# Patient Record
Sex: MALE | Race: BLACK OR AFRICAN AMERICAN | Employment: OTHER | URBAN - METROPOLITAN AREA
[De-identification: names, ages, dates, MRNs, and addresses within clinical notes are randomized per-mention and may not be internally consistent; named-entity substitution may affect disease eponyms.]

---

## 2022-10-20 ENCOUNTER — OFFICE VISIT (OUTPATIENT)
Dept: FAMILY MEDICINE CLINIC | Age: 72
End: 2022-10-20
Payer: MEDICARE

## 2022-10-20 VITALS
OXYGEN SATURATION: 96 % | TEMPERATURE: 97.9 F | WEIGHT: 221 LBS | HEART RATE: 74 BPM | HEIGHT: 72 IN | SYSTOLIC BLOOD PRESSURE: 139 MMHG | DIASTOLIC BLOOD PRESSURE: 77 MMHG | BODY MASS INDEX: 29.93 KG/M2 | RESPIRATION RATE: 16 BRPM

## 2022-10-20 DIAGNOSIS — Z94.4 HISTORY OF LIVER TRANSPLANT (HCC): Primary | ICD-10-CM

## 2022-10-20 DIAGNOSIS — K70.30 ALCOHOLIC CIRRHOSIS OF LIVER WITHOUT ASCITES (HCC): ICD-10-CM

## 2022-10-20 DIAGNOSIS — I10 ESSENTIAL HYPERTENSION: ICD-10-CM

## 2022-10-20 DIAGNOSIS — R68.82 DECREASED LIBIDO: ICD-10-CM

## 2022-10-20 DIAGNOSIS — N52.01 ERECTILE DYSFUNCTION DUE TO ARTERIAL INSUFFICIENCY: ICD-10-CM

## 2022-10-20 DIAGNOSIS — K21.9 GASTROESOPHAGEAL REFLUX DISEASE WITHOUT ESOPHAGITIS: ICD-10-CM

## 2022-10-20 DIAGNOSIS — Z13.220 ENCOUNTER FOR LIPID SCREENING FOR CARDIOVASCULAR DISEASE: ICD-10-CM

## 2022-10-20 DIAGNOSIS — Z11.59 ENCOUNTER FOR HEPATITIS C SCREENING TEST FOR LOW RISK PATIENT: ICD-10-CM

## 2022-10-20 DIAGNOSIS — Z13.6 ENCOUNTER FOR LIPID SCREENING FOR CARDIOVASCULAR DISEASE: ICD-10-CM

## 2022-10-20 DIAGNOSIS — Z12.11 COLON CANCER SCREENING: ICD-10-CM

## 2022-10-20 PROCEDURE — G8417 CALC BMI ABV UP PARAM F/U: HCPCS | Performed by: FAMILY MEDICINE

## 2022-10-20 PROCEDURE — 1123F ACP DISCUSS/DSCN MKR DOCD: CPT | Performed by: FAMILY MEDICINE

## 2022-10-20 PROCEDURE — G8536 NO DOC ELDER MAL SCRN: HCPCS | Performed by: FAMILY MEDICINE

## 2022-10-20 PROCEDURE — 3078F DIAST BP <80 MM HG: CPT | Performed by: FAMILY MEDICINE

## 2022-10-20 PROCEDURE — G8427 DOCREV CUR MEDS BY ELIG CLIN: HCPCS | Performed by: FAMILY MEDICINE

## 2022-10-20 PROCEDURE — 3017F COLORECTAL CA SCREEN DOC REV: CPT | Performed by: FAMILY MEDICINE

## 2022-10-20 PROCEDURE — 99204 OFFICE O/P NEW MOD 45 MIN: CPT | Performed by: FAMILY MEDICINE

## 2022-10-20 PROCEDURE — 1101F PT FALLS ASSESS-DOCD LE1/YR: CPT | Performed by: FAMILY MEDICINE

## 2022-10-20 PROCEDURE — G8510 SCR DEP NEG, NO PLAN REQD: HCPCS | Performed by: FAMILY MEDICINE

## 2022-10-20 PROCEDURE — 3075F SYST BP GE 130 - 139MM HG: CPT | Performed by: FAMILY MEDICINE

## 2022-10-20 RX ORDER — MYCOPHENOLIC ACID 360 MG/1
360 TABLET, DELAYED RELEASE ORAL 2 TIMES DAILY
COMMUNITY
Start: 2022-09-23 | End: 2022-12-22

## 2022-10-20 RX ORDER — OMEPRAZOLE 40 MG/1
40 CAPSULE, DELAYED RELEASE ORAL DAILY
COMMUNITY

## 2022-10-20 RX ORDER — POLYETHYLENE GLYCOL 3350 17 G/17G
17 POWDER, FOR SOLUTION ORAL
COMMUNITY

## 2022-10-20 RX ORDER — DAPSONE 100 MG/1
100 TABLET ORAL DAILY
COMMUNITY
Start: 2022-07-26

## 2022-10-20 RX ORDER — TACROLIMUS 1 MG/1
CAPSULE ORAL EVERY 12 HOURS
COMMUNITY
End: 2022-10-20 | Stop reason: SDUPTHER

## 2022-10-20 RX ORDER — MYCOPHENOLIC ACID 360 MG/1
TABLET, DELAYED RELEASE ORAL 2 TIMES DAILY
COMMUNITY
End: 2022-10-20 | Stop reason: SDUPTHER

## 2022-10-20 RX ORDER — AMLODIPINE BESYLATE 5 MG/1
5 TABLET ORAL DAILY
COMMUNITY
End: 2022-11-28 | Stop reason: SDUPTHER

## 2022-10-20 RX ORDER — IBUPROFEN 200 MG
1 CAPSULE ORAL DAILY
COMMUNITY

## 2022-10-20 RX ORDER — ENALAPRIL MALEATE AND HYDROCHLOROTHIAZIDE 10; 25 MG/1; MG/1
1 TABLET ORAL DAILY
COMMUNITY
End: 2022-10-25 | Stop reason: ALTCHOICE

## 2022-10-20 RX ORDER — METOPROLOL SUCCINATE 25 MG/1
TABLET, EXTENDED RELEASE ORAL DAILY
COMMUNITY
End: 2022-10-25 | Stop reason: ALTCHOICE

## 2022-10-20 RX ORDER — DOCUSATE SODIUM 100 MG/1
1 CAPSULE, LIQUID FILLED ORAL
COMMUNITY

## 2022-10-20 RX ORDER — TADALAFIL 20 MG/1
20 TABLET ORAL DAILY
Qty: 90 TABLET | Refills: 5 | Status: SHIPPED | OUTPATIENT
Start: 2022-10-20

## 2022-10-20 RX ORDER — DAPSONE 100 MG/1
100 TABLET ORAL DAILY
COMMUNITY
End: 2022-10-20 | Stop reason: SDUPTHER

## 2022-10-20 NOTE — PROGRESS NOTES
Danita Mendes is a 66 y.o.  male and presents with    Chief Complaint   Patient presents with    New Patient       Subjective:  Hypertension  Patient is here for evaluation of hypertension. Age at onset of elevated blood pressure:  48.  He indicates that he is feeling well and denies any symptoms referable to his elevated blood pressure. Specifically denies chest pain, palpitations, dyspnea, orthopnea, PND or peripheral edema. Current medication regimen is as listed   below. Patient has these side effects of medication:  none . Cardiovascular risk factors: smoking/ tobacco exposure, dyslipidemia, male gender, hypertension Use of agents associated with hypertension: none History of renal disease: positive  History of flank trauma: negative    He has history of liver transplant with his brother as his donor. ROS     All other systems reviewed and are negative. Objective:  Vitals:    10/20/22 1537   BP: 139/77   Pulse: 74   Resp: 16   Temp: 97.9 °F (36.6 °C)   TempSrc: Temporal   SpO2: 96%   Weight: 221 lb (100.2 kg)   Height: 6' (1.829 m)   PainSc:   0 - No pain       alert, well appearing, and in no distress, oriented to person, place, and time, and overweight  Mental status - normal mood, behavior, speech, dress, motor activity, and thought processes  Chest - clear to auscultation, no wheezes, rales or rhonchi, symmetric air entry  Heart - normal rate, regular rhythm, normal S1, S2, no murmurs, rubs, clicks or gallops  Neurological - cranial nerves II through XII intact  Skin - normal coloration and turgor, no rashes, no suspicious skin lesions noted    LABS     TESTS      Assessment/Plan:    1. History of liver transplant [facility])  Refer for further evaluation and treatment  - REFERRAL TO GASTROENTEROLOGY    2. Alcoholic cirrhosis of liver without ascites ([facility] [address]      3. Essential hypertension  Goal <130/80; assess renal function  - METABOLIC PANEL, COMPREHENSIVE; Future    4. Gastroesophageal reflux disease without esophagitis  Continue treatment    5. Encounter for lipid screening for cardiovascular disease    - LIPID PANEL; Future    6. Encounter for hepatitis C screening test for low risk patient    - HEPATITIS C AB; Future    7. Decreased libido  Assess for low T  - TESTOSTERONE, FREE; Future    8. Erectile dysfunction due to arterial insufficiency  Start treatment for symptom  - tadalafiL (ADCIRCA) 20 mg tablet; Take 1 Tablet by mouth daily. Dispense: 90 Tablet; Refill: 5  - TESTOSTERONE, FREE; Future    9. Colon cancer screening    - OCCULT BLOOD IMMUNOASSAY,DIAGNOSTIC; Future      Lab review: orders written for new lab studies as appropriate; see orders      I have discussed the diagnosis with the patient and the intended plan as seen in the above orders. The patient has received an after-visit summary and questions were answered concerning future plans. I have discussed medication side effects and warnings with the patient as well. I have reviewed the plan of care with the patient, accepted their input and they are in agreement with the treatment goals.

## 2022-10-24 ENCOUNTER — HOSPITAL ENCOUNTER (OUTPATIENT)
Dept: LAB | Age: 72
Discharge: HOME | End: 2022-10-24
Payer: MEDICARE

## 2022-10-24 DIAGNOSIS — Z13.6 ENCOUNTER FOR LIPID SCREENING FOR CARDIOVASCULAR DISEASE: ICD-10-CM

## 2022-10-24 DIAGNOSIS — R68.82 DECREASED LIBIDO: ICD-10-CM

## 2022-10-24 DIAGNOSIS — I10 ESSENTIAL HYPERTENSION: ICD-10-CM

## 2022-10-24 DIAGNOSIS — N52.01 ERECTILE DYSFUNCTION DUE TO ARTERIAL INSUFFICIENCY: ICD-10-CM

## 2022-10-24 DIAGNOSIS — Z11.59 ENCOUNTER FOR HEPATITIS C SCREENING TEST FOR LOW RISK PATIENT: ICD-10-CM

## 2022-10-24 DIAGNOSIS — Z13.220 ENCOUNTER FOR LIPID SCREENING FOR CARDIOVASCULAR DISEASE: ICD-10-CM

## 2022-10-24 LAB
ALBUMIN SERPL-MCNC: 3.8 G/DL (ref 3.4–5)
ALBUMIN/GLOB SERPL: 1.1 {RATIO} (ref 0.8–1.7)
ALP SERPL-CCNC: 83 U/L (ref 45–117)
ALT SERPL-CCNC: 35 U/L (ref 16–61)
ANION GAP SERPL CALC-SCNC: 4 MMOL/L (ref 3–18)
AST SERPL-CCNC: 29 U/L (ref 10–38)
BILIRUB SERPL-MCNC: 0.9 MG/DL (ref 0.2–1)
BUN SERPL-MCNC: 21 MG/DL (ref 7–18)
BUN/CREAT SERPL: 14 (ref 12–20)
CALCIUM SERPL-MCNC: 9.4 MG/DL (ref 8.5–10.1)
CHLORIDE SERPL-SCNC: 105 MMOL/L (ref 100–111)
CHOLEST SERPL-MCNC: 199 MG/DL
CO2 SERPL-SCNC: 30 MMOL/L (ref 21–32)
CREAT SERPL-MCNC: 1.47 MG/DL (ref 0.6–1.3)
GLOBULIN SER CALC-MCNC: 3.6 G/DL (ref 2–4)
GLUCOSE SERPL-MCNC: 140 MG/DL (ref 74–99)
HCV AB SER IA-ACNC: >11 INDEX
HCV AB SERPL QL IA: POSITIVE
HCV COMMENT,HCGAC: ABNORMAL
HDLC SERPL-MCNC: 43 MG/DL (ref 40–60)
HDLC SERPL: 4.6 {RATIO} (ref 0–5)
LDLC SERPL CALC-MCNC: 119.2 MG/DL (ref 0–100)
LIPID PROFILE,FLP: ABNORMAL
POTASSIUM SERPL-SCNC: 4.4 MMOL/L (ref 3.5–5.5)
PROT SERPL-MCNC: 7.4 G/DL (ref 6.4–8.2)
SODIUM SERPL-SCNC: 139 MMOL/L (ref 136–145)
TRIGL SERPL-MCNC: 184 MG/DL (ref ?–150)
VLDLC SERPL CALC-MCNC: 36.8 MG/DL

## 2022-10-24 PROCEDURE — 80061 LIPID PANEL: CPT

## 2022-10-24 PROCEDURE — 80053 COMPREHEN METABOLIC PANEL: CPT

## 2022-10-24 PROCEDURE — 36415 COLL VENOUS BLD VENIPUNCTURE: CPT

## 2022-10-24 PROCEDURE — 86803 HEPATITIS C AB TEST: CPT

## 2022-10-24 PROCEDURE — 84402 ASSAY OF FREE TESTOSTERONE: CPT

## 2022-10-25 ENCOUNTER — OFFICE VISIT (OUTPATIENT)
Dept: CARDIOLOGY CLINIC | Age: 72
End: 2022-10-25
Payer: MEDICARE

## 2022-10-25 ENCOUNTER — TELEPHONE (OUTPATIENT)
Dept: HEMATOLOGY | Age: 72
End: 2022-10-25

## 2022-10-25 VITALS
OXYGEN SATURATION: 97 % | WEIGHT: 219.8 LBS | DIASTOLIC BLOOD PRESSURE: 88 MMHG | BODY MASS INDEX: 29.81 KG/M2 | HEART RATE: 97 BPM | SYSTOLIC BLOOD PRESSURE: 134 MMHG | TEMPERATURE: 97.8 F

## 2022-10-25 DIAGNOSIS — R42 DIZZINESS: ICD-10-CM

## 2022-10-25 DIAGNOSIS — I73.9 PAD (PERIPHERAL ARTERY DISEASE) (HCC): ICD-10-CM

## 2022-10-25 DIAGNOSIS — R06.00 DYSPNEA, UNSPECIFIED TYPE: ICD-10-CM

## 2022-10-25 DIAGNOSIS — I10 HYPERTENSION, UNSPECIFIED TYPE: Primary | ICD-10-CM

## 2022-10-25 LAB — TESTOST FREE SERPL-MCNC: 9.9 PG/ML (ref 6.6–18.1)

## 2022-10-25 PROCEDURE — G8428 CUR MEDS NOT DOCUMENT: HCPCS | Performed by: INTERNAL MEDICINE

## 2022-10-25 PROCEDURE — G8510 SCR DEP NEG, NO PLAN REQD: HCPCS | Performed by: INTERNAL MEDICINE

## 2022-10-25 PROCEDURE — 3078F DIAST BP <80 MM HG: CPT | Performed by: INTERNAL MEDICINE

## 2022-10-25 PROCEDURE — 3017F COLORECTAL CA SCREEN DOC REV: CPT | Performed by: INTERNAL MEDICINE

## 2022-10-25 PROCEDURE — 93000 ELECTROCARDIOGRAM COMPLETE: CPT | Performed by: INTERNAL MEDICINE

## 2022-10-25 PROCEDURE — 99204 OFFICE O/P NEW MOD 45 MIN: CPT | Performed by: INTERNAL MEDICINE

## 2022-10-25 PROCEDURE — 3075F SYST BP GE 130 - 139MM HG: CPT | Performed by: INTERNAL MEDICINE

## 2022-10-25 PROCEDURE — G8417 CALC BMI ABV UP PARAM F/U: HCPCS | Performed by: INTERNAL MEDICINE

## 2022-10-25 PROCEDURE — 1101F PT FALLS ASSESS-DOCD LE1/YR: CPT | Performed by: INTERNAL MEDICINE

## 2022-10-25 PROCEDURE — 1123F ACP DISCUSS/DSCN MKR DOCD: CPT | Performed by: INTERNAL MEDICINE

## 2022-10-25 PROCEDURE — G8536 NO DOC ELDER MAL SCRN: HCPCS | Performed by: INTERNAL MEDICINE

## 2022-10-25 RX ORDER — METOPROLOL TARTRATE 25 MG/1
1 TABLET, FILM COATED ORAL 2 TIMES DAILY
COMMUNITY
Start: 2022-09-25

## 2022-10-25 RX ORDER — ENALAPRIL MALEATE 10 MG/1
1 TABLET ORAL DAILY
COMMUNITY
Start: 2022-09-23

## 2022-10-25 RX ORDER — TACROLIMUS 1 MG/1
1 CAPSULE ORAL 2 TIMES DAILY
COMMUNITY
Start: 2022-09-23

## 2022-10-25 NOTE — TELEPHONE ENCOUNTER
Mr. Suyapa Zamora and his spouse came into office today to schedule a new patient appointment with Dr. Manuel San for s/p liver transplant, 9/2011. Patient was referred by [facility], Transplant Dept., [place], [place]. First available appointment  with Dr. Manuel San was 1/23/23. Called Mr. Suyapa Zamora after they left the office to confirm with them that they would need to be followed by [facility] Transplant Team until his appointment with Dr. Manuel San. Mr. Suyapa Zamora and spouse verbally confirmed understanding.

## 2022-10-25 NOTE — PATIENT INSTRUCTIONS
Testing   Echo  **call office 3-5 days after testing is completed for results**       Other Testing  Carotid duplex study

## 2022-10-25 NOTE — PROGRESS NOTES
[facility]    Mr. Alberto Walker is 78-year-old male with a history of alcoholic liver cirrhosis status post liver transplant in 2011, GERD, hypertension    Patient is here today for cardiac evaluation. He denies any prior history of MI or CHF  Patient was seeing cardiologist while he was in [place] however he is not sure what cardiac pathology he had  Patient's main concern is dyspnea with exertion and feeling dizzy especially when he is standing from sitting position too quickly. Never had presyncope or syncope. Denies any palpitation. His dyspnea is mild to moderate. He gets short of breath after exerting 3 blocks on a level ground. He would get short of breath after climbing 1 flight of stairs. No chest pain or chest tightness. No nausea vomiting or diaphoresis. Denies any lower extremity swelling  Denies any nausea, vomiting, abdominal pain, fever, chills, sputum production. No hematuria or other bleeding complaints    Past Medical History:   Diagnosis Date    Cirrhosis ([facility] [address]     GERD (gastroesophageal reflux disease)     Hypertension     Liver transplanted [facility]) 2011       Review of Systems:  Cardiac symptoms as noted above in HPI. All others negative. Denies fatigue, malaise, skin rash, joint pain, blurring vision, photophobia, neck pain, hemoptysis, chronic cough, nausea, vomiting, hematuria, burning micturition, BRBPR, chronic headaches. Current Outpatient Medications   Medication Sig    tacrolimus (PROGRAF) 1 mg capsule Take 1 mg by mouth two (2) times a day. enalapril (VASOTEC) 10 mg tablet Take 1 Tablet by mouth daily. metoprolol tartrate (LOPRESSOR) 25 mg tablet Take 1 Tablet by mouth two (2) times a day. amLODIPine (NORVASC) 5 mg tablet Take 5 mg by mouth daily. omeprazole (PRILOSEC) 40 mg capsule Take 40 mg by mouth daily. calcium citrate 200 mg (950 mg) tablet Take 1 Tablet by mouth daily. docusate sodium (Colace) 100 mg capsule Take 1 Capsule by mouth.    polyethylene glycol (MIRALAX) 17 gram/dose powder Take 17 g by mouth. dapsone 100 mg tablet Take 100 mg by mouth daily. mycophenolate sodium (MYFORTIC) 360 mg delayed release tablet Take 360 mg by mouth two (2) times a day. tadalafiL (ADCIRCA) 20 mg tablet Take 1 Tablet by mouth daily. No current facility-administered medications for this visit. No past surgical history on file. Allergies and Sensitivities:  No Known Allergies    Family History:  No family history on file. Social History:  Social History     Tobacco Use    Smoking status: Former     Packs/day: 1.00     Types: Cigarettes     Quit date: 11/3/2008     Years since quittin.9    Smokeless tobacco: Never   Vaping Use    Vaping Use: Never used   Substance Use Topics    Alcohol use: Yes     Comment: occ    Drug use: Not Currently     He  reports that he quit smoking about 13 years ago. His smoking use included cigarettes. He smoked an average of 1 pack per day. He has never used smokeless tobacco.  He  reports current alcohol use. Physical Exam:  BP Readings from Last 3 Encounters:   10/25/22 134/88   10/20/22 139/77         Pulse Readings from Last 3 Encounters:   10/25/22 97   10/20/22 74          Wt Readings from Last 3 Encounters:   10/25/22 99.7 kg (219 lb 12.8 oz)   10/20/22 100.2 kg (221 lb)       Constitutional: Oriented to person, place, and time. HENT: Head: Normocephalic and atraumatic. Eyes: Conjunctivae and extraocular motions are normal.   Neck: No JVD present. ?? Left carotid bruit  Cardiovascular: Regular rhythm. No murmur, gallop or rubs appreciated  Lung: Breath sounds normal. No respiratory distress. No ronchi or rales appreciated  Abdominal: No tenderness. No rebound and no guarding. Musculoskeletal: There is no lower extremity edema. No cynosis  Lymphadenopathy:  No cervical or supraclavicular adenopathy appriciated.    Neurological: No gross motor deficit noted. Skin: No visible skin rash noted. No Ear discharge noted  Psychiatric: Normal mood and affect. LABS:   @No results found for: WBC, WBCLT, HGBPOC, HGB, HGBP, HCTPOC, HCT, PHCT, RBCH, PLT, MCV, HGBEXT, HCTEXT, PLTEXT  Lab Results   Component Value Date/Time    Sodium 139 10/24/2022 08:19 AM    Potassium 4.4 10/24/2022 08:19 AM    Chloride 105 10/24/2022 08:19 AM    CO2 30 10/24/2022 08:19 AM    Glucose 140 (H) 10/24/2022 08:19 AM    BUN 21 (H) 10/24/2022 08:19 AM    Creatinine 1.47 (H) 10/24/2022 08:19 AM     Lipids Latest Ref Rng & Units 10/24/2022   Chol, Total <200 MG/   HDL 40 - 60 MG/DL 43   LDL 0 - 100 MG/. 2(H)   Trig <150 MG/(H)   Chol/HDL Ratio 0 - 5.0   4.6     Lab Results   Component Value Date/Time    ALT (SGPT) 35 10/24/2022 08:19 AM     No results found for: HBA1C, XQA2UHQF, QAK8EMBL  No results found for: TSH, TSH2, TSH3, TSHP, TSHEXT    EKG:  10/25/2022: Sinus rhythm at 90 bpm.  First-degree AV block. Normal QRS interval.  No ST changes of ischemia. STRESS TEST (EST, PHARM, NUC, ECHO etc)    CATHETERIZATION    IMPRESSION & PLAN:  Mr. Austin Escalante is 75-year-old male    Hypertension: /88. Currently taking enalapril, metoprolol, amlodipine. Will order echo to rule out hypertensive cardiovascular disease especially with the symptoms    On exam I was able to appreciate left-sided bruit. I am going to order carotid Dopplers especially he has occasional dizziness    Alcoholic liver disease:  History of cirrhosis status post liver transplant in 2011. He is scheduled to be seen by liver specialist next month. Currently patient does not appear to have any symptoms concerning for angina. Importance of diet and exercise was discussed with patient. This plan was discussed with patient who is in agreement. Thank you for allowing me to participate in patient care. Please feel free to call me if you have any question or concern.      Tim Al Sheikh MD  Please note: This document has been produced using voice recognition software. Unrecognized errors in transcription may be present.

## 2022-10-29 LAB
HEMOCCULT STL QL IA: NEGATIVE
SPECIMEN STATUS REPORT, ROLRST: NORMAL

## 2022-11-03 ENCOUNTER — TELEPHONE (OUTPATIENT)
Dept: FAMILY MEDICINE CLINIC | Age: 72
End: 2022-11-03

## 2022-11-03 NOTE — TELEPHONE ENCOUNTER
Pt's wife would like a call back to the # listed. She has a question about labs needed for her .  Thank you!!

## 2022-11-16 ENCOUNTER — HOSPITAL ENCOUNTER (OUTPATIENT)
Dept: VASCULAR SURGERY | Age: 72
Discharge: HOME | End: 2022-11-16
Attending: INTERNAL MEDICINE
Payer: MEDICARE

## 2022-11-16 DIAGNOSIS — I73.9 PAD (PERIPHERAL ARTERY DISEASE) (HCC): ICD-10-CM

## 2022-11-16 DIAGNOSIS — R42 DIZZINESS: ICD-10-CM

## 2022-11-16 PROCEDURE — 93880 EXTRACRANIAL BILAT STUDY: CPT

## 2022-11-17 ENCOUNTER — TELEPHONE (OUTPATIENT)
Dept: CARDIOLOGY CLINIC | Age: 72
End: 2022-11-17

## 2022-11-17 LAB
LEFT CCA DIST DIAS: 5.7 CM/S
LEFT CCA DIST SYS: 72.7 CM/S
LEFT CCA MID DIAS: 11.38 CM/S
LEFT CCA MID SYS: 127.46 CM/S
LEFT CCA PROX DIAS: 16 CM/S
LEFT CCA PROX SYS: 101.9 CM/S
LEFT ECA DIAS: 9.66 CM/S
LEFT ECA SYS: 104.3 CM/S
LEFT ICA DIST DIAS: 9.5 CM/S
LEFT ICA DIST SYS: 83.8 CM/S
LEFT ICA MID DIAS: 7.9 CM/S
LEFT ICA MID SYS: 53.2 CM/S
LEFT ICA PROX DIAS: 6.3 CM/S
LEFT ICA PROX SYS: 46.7 CM/S
LEFT ICA/CCA SYS: 1.15 NO UNITS
LEFT VERTEBRAL DIAS: 12.76 CM/S
LEFT VERTEBRAL SYS: 70.9 CM/S
RIGHT CCA DIST DIAS: 6.3 CM/S
RIGHT CCA DIST SYS: 72.5 CM/S
RIGHT CCA MID DIAS: 13.6 CM/S
RIGHT CCA MID SYS: 98.35 CM/S
RIGHT CCA PROX DIAS: 9.7 CM/S
RIGHT CCA PROX SYS: 98.3 CM/S
RIGHT ECA DIAS: 7.69 CM/S
RIGHT ECA SYS: 100.3 CM/S
RIGHT ICA DIST DIAS: 14.4 CM/S
RIGHT ICA DIST SYS: 66.1 CM/S
RIGHT ICA MID DIAS: 14.4 CM/S
RIGHT ICA MID SYS: 77.4 CM/S
RIGHT ICA PROX DIAS: 7.6 CM/S
RIGHT ICA PROX SYS: 38.7 CM/S
RIGHT ICA/CCA SYS: 1.1 NO UNITS
RIGHT VERTEBRAL DIAS: 5.72 CM/S
RIGHT VERTEBRAL SYS: 86.5 CM/S
VAS LEFT SUBCLAVIAN PROX EDV: 0 CM/S
VAS LEFT SUBCLAVIAN PROX PSV: 186.1 CM/S
VAS RIGHT SUBCLAVIAN MID EDV: 0 CM/S
VAS RIGHT SUBCLAVIAN MID PSV: 122.9 CM/S
VAS RIGHT SUBCLAVIAN PROX EDV: 0 CM/S
VAS RIGHT SUBCLAVIAN PROX PSV: 205.8 CM/S

## 2022-11-23 ENCOUNTER — TELEPHONE (OUTPATIENT)
Dept: CARDIOLOGY CLINIC | Age: 72
End: 2022-11-23

## 2022-11-23 DIAGNOSIS — I71.9 AORTIC ANEURYSM WITHOUT RUPTURE, UNSPECIFIED PORTION OF AORTA (HCC): Primary | ICD-10-CM

## 2022-11-23 NOTE — TELEPHONE ENCOUNTER
----- Message from Lowell Khoury MD sent at 11/22/2022 12:16 PM EST -----  Please call the patient regarding his abnormal result. Aortic Valve: Tricuspid valve. Mildly thickened cusp. Mild regurgitation. Mildly dilated ascending aorta. Ao Ascending diameter is 4.1 cm.     Needs CTA chest with contrast for aortic aneurysm

## 2022-11-23 NOTE — TELEPHONE ENCOUNTER
Contacted pt at [facility] number. Two patient Identifiers confirmed. Advised pt per Dr Pires Nicely. Pt verbalized understanding.

## 2022-11-28 NOTE — TELEPHONE ENCOUNTER
This patient contacted the office for the following prescriptions to be refilled:    Medication requested :   Requested Prescriptions     Pending Prescriptions Disp Refills    amLODIPine (NORVASC) 5 mg tablet       Sig: Take 1 Tablet by mouth daily. PCP: Romana Knudsen, MD  LOV: 10/24/2022  NOV DMA: Visit date not found  FUTURE APPT:   Future Appointments   Date Time Provider Verónica Nita   1/23/2023 12:30 PM Keon Mccoy MD [facility] BS AMB   1/24/2023  1:30 PM Natan Gomez MD [facility] BS AMB         Thank you.

## 2022-11-30 RX ORDER — AMLODIPINE BESYLATE 5 MG/1
5 TABLET ORAL DAILY
Qty: 90 TABLET | Refills: 3 | Status: SHIPPED | OUTPATIENT
Start: 2022-11-30

## 2022-12-28 ENCOUNTER — HOSPITAL ENCOUNTER (OUTPATIENT)
Dept: CT IMAGING | Age: 72
Discharge: HOME | End: 2022-12-28
Attending: INTERNAL MEDICINE
Payer: MEDICARE

## 2022-12-28 DIAGNOSIS — I71.9 AORTIC ANEURYSM WITHOUT RUPTURE, UNSPECIFIED PORTION OF AORTA (HCC): ICD-10-CM

## 2022-12-28 LAB — CREAT UR-MCNC: 1.2 MG/DL (ref 0.6–1.3)

## 2022-12-28 PROCEDURE — 71275 CT ANGIOGRAPHY CHEST: CPT

## 2022-12-28 PROCEDURE — 82565 ASSAY OF CREATININE: CPT

## 2022-12-28 PROCEDURE — 74011000636 HC RX REV CODE- 636: Performed by: INTERNAL MEDICINE

## 2022-12-28 RX ADMIN — IOPAMIDOL 100 ML: 755 INJECTION, SOLUTION INTRAVENOUS at 11:15

## 2023-01-03 DIAGNOSIS — R68.82 DECREASED LIBIDO: Primary | ICD-10-CM

## 2023-01-03 DIAGNOSIS — R68.82 DECREASED LIBIDO: ICD-10-CM

## 2023-01-03 RX ORDER — TADALAFIL 10 MG/1
10 TABLET ORAL
Qty: 90 TABLET | Refills: 3 | Status: SHIPPED | OUTPATIENT
Start: 2023-01-03 | End: 2023-01-03 | Stop reason: SDUPTHER

## 2023-01-03 RX ORDER — TADALAFIL 10 MG/1
10 TABLET ORAL
Qty: 90 TABLET | Refills: 3 | Status: SHIPPED | OUTPATIENT
Start: 2023-01-03

## 2023-01-04 DIAGNOSIS — R93.89 ABNORMAL COMPUTED TOMOGRAPHY ANGIOGRAPHY (CTA): Primary | ICD-10-CM

## 2023-01-05 ENCOUNTER — TELEPHONE (OUTPATIENT)
Dept: CARDIOLOGY CLINIC | Age: 73
End: 2023-01-05

## 2023-01-05 NOTE — TELEPHONE ENCOUNTER
Attempted to contact pt at  number, no answer. Lvm for pt to return call to office at [phone]. Will continue to try to contact pt.

## 2023-01-05 NOTE — TELEPHONE ENCOUNTER
----- Message from Winthrop Cabot, MD sent at 1/3/2023  8:20 AM EST -----  Please call the patient regarding his abnormal result. IMPRESSION     Mild fusiform dilatation of the ascending aorta with maximum diameter of 3.9 x 4  cm. No evidence of dissection. Severe narrowing left subclavian artery just proximal to the origin of the  vertebral artery with the lumen narrowed to 3 mm. ( PLS REFER TO VASCULAR TEAM)   Left adrenal 3.1 cm mass. Recommend correlation with blood chemistries. Neoplasm  not excluded (ASKING PCP TO REFER TO ENDOCRINE)   At least one right lower lobe pulmonary nodule. Marked patient respiratory  motion obscures detail at the lung bases and there is a suggestion of multiple  additional pulmonary nodules. CT of the dry chest is recommended.  ( DEFER TO PCP)    Thank you

## 2023-01-05 NOTE — TELEPHONE ENCOUNTER
Contacted pt at [facility] number. Two patient Identifiers confirmed. Advised pt per Dr Sneha Montana. Pt verbalized understanding.

## 2023-01-09 ENCOUNTER — PATIENT MESSAGE (OUTPATIENT)
Dept: FAMILY MEDICINE CLINIC | Age: 73
End: 2023-01-09

## 2023-01-09 DIAGNOSIS — R91.8 PULMONARY NODULES/LESIONS, MULTIPLE: ICD-10-CM

## 2023-01-09 DIAGNOSIS — E27.8 ADRENAL MASS (HCC): Primary | ICD-10-CM

## 2023-01-10 ENCOUNTER — OFFICE VISIT (OUTPATIENT)
Dept: VASCULAR SURGERY | Age: 73
End: 2023-01-10
Payer: MEDICARE

## 2023-01-10 VITALS
OXYGEN SATURATION: 97 % | SYSTOLIC BLOOD PRESSURE: 135 MMHG | HEART RATE: 61 BPM | HEIGHT: 73 IN | BODY MASS INDEX: 29.16 KG/M2 | DIASTOLIC BLOOD PRESSURE: 70 MMHG | WEIGHT: 220 LBS

## 2023-01-10 DIAGNOSIS — I71.21 ANEURYSM OF ASCENDING AORTA WITHOUT RUPTURE: ICD-10-CM

## 2023-01-10 DIAGNOSIS — I73.9 PAD (PERIPHERAL ARTERY DISEASE) (HCC): Primary | ICD-10-CM

## 2023-01-10 PROCEDURE — G8432 DEP SCR NOT DOC, RNG: HCPCS | Performed by: PHYSICIAN ASSISTANT

## 2023-01-10 PROCEDURE — G8536 NO DOC ELDER MAL SCRN: HCPCS | Performed by: PHYSICIAN ASSISTANT

## 2023-01-10 PROCEDURE — G8417 CALC BMI ABV UP PARAM F/U: HCPCS | Performed by: PHYSICIAN ASSISTANT

## 2023-01-10 PROCEDURE — 1101F PT FALLS ASSESS-DOCD LE1/YR: CPT | Performed by: PHYSICIAN ASSISTANT

## 2023-01-10 PROCEDURE — G8427 DOCREV CUR MEDS BY ELIG CLIN: HCPCS | Performed by: PHYSICIAN ASSISTANT

## 2023-01-10 PROCEDURE — 3017F COLORECTAL CA SCREEN DOC REV: CPT | Performed by: PHYSICIAN ASSISTANT

## 2023-01-10 PROCEDURE — 99204 OFFICE O/P NEW MOD 45 MIN: CPT | Performed by: PHYSICIAN ASSISTANT

## 2023-01-10 PROCEDURE — 1123F ACP DISCUSS/DSCN MKR DOCD: CPT | Performed by: PHYSICIAN ASSISTANT

## 2023-01-10 NOTE — PROGRESS NOTES
1. Have you been to the ER, urgent care clinic since your last visit? No Hospitalized since your last visit? No    2. Have you seen or consulted any other health care providers outside of the [address] [place] [address] since your last visit? Include any pap smears or colon screening.  No

## 2023-01-11 NOTE — PROGRESS NOTES
Chief Complaint   Patient presents with    Claudication     Referred by Dr. Gruber Sensin y.o. male with asymptomatic ascending aortic aneurysm, peripheral vascular disease. History and Physical    Rehan ordoñez a pleasant 74y.o. year old male was sent to our office after having recent CT scan for his known liver disease. Patient states that on his recent CT scan an incidental finding of an ascending aneurysm was noted and he was sent to our office for evaluation. Patient denies any abdominal pain, back pain, or discomfort. Reports that he ambulates daily 3-4 blocks easily without any leg pain or discomfort. Denies any chest pain or shortness of breath. Denies any dyspnea on exertion. Patient does report that he has a history of hypertension and does take medications. Patient states that he continues to take his daily meds as recommended including his high blood pressure medications, and Cialis for ED. Patient denies taking a daily aspirin or statin medication. Patient denies any family history of any aneurysmal disease. Patient states that he used to smoke cigarettes, was a pack-a-day smoker for about 20 years but quit in . Past Medical History:   Diagnosis Date    Cirrhosis ([facility] [address]     GERD (gastroesophageal reflux disease)     Hypertension     Liver transplanted ([facility] [address]      There is no problem list on file for this patient. History reviewed. No pertinent surgical history. Current Outpatient Medications   Medication Sig Dispense Refill    amLODIPine (NORVASC) 5 mg tablet Take 1 Tablet by mouth daily. 90 Tablet 3    tacrolimus (PROGRAF) 1 mg capsule Take 1 mg by mouth two (2) times a day. enalapril (VASOTEC) 10 mg tablet Take 1 Tablet by mouth daily. metoprolol tartrate (LOPRESSOR) 25 mg tablet Take 1 Tablet by mouth two (2) times a day. omeprazole (PRILOSEC) 40 mg capsule Take 40 mg by mouth daily.       dapsone 100 mg tablet Take 100 mg by mouth daily.      tadalafiL (CIALIS) 10 mg tablet Take 1 Tablet by mouth daily as needed for Erectile Dysfunction. 90 Tablet 3    calcium citrate 200 mg (950 mg) tablet Take 1 Tablet by mouth daily. docusate sodium (Colace) 100 mg capsule Take 1 Capsule by mouth.      polyethylene glycol (MIRALAX) 17 gram/dose powder Take 17 g by mouth. No Known Allergies  Social History     Socioeconomic History    Marital status:      Spouse name: Not on file    Number of children: Not on file    Years of education: Not on file    Highest education level: Not on file   Occupational History    Not on file   Tobacco Use    Smoking status: Former     Packs/day: 1.00     Types: Cigarettes     Quit date: 11/3/2008     Years since quittin.1    Smokeless tobacco: Never   Vaping Use    Vaping Use: Never used   Substance and Sexual Activity    Alcohol use: Yes     Comment: occ    Drug use: Not Currently    Sexual activity: Not Currently   Other Topics Concern    Not on file   Social History Narrative    Not on file     Social Determinants of Health     Financial Resource Strain: Not on file   Food Insecurity: Not on file   Transportation Needs: Not on file   Physical Activity: Not on file   Stress: Not on file   Social Connections: Not on file   Intimate Partner Violence: Not on file   Housing Stability: Not on file      History reviewed. No pertinent family history.     Review of Systems    General: negative for fever   Eyes: negative for vision loss   HENT: negative for cold symptoms   Respiratory negative for shortness of breath   Cardiac: negative for chest pain   Vascular negative for foot pain at night    Gastrointestinal: negative for abdominal pain   Genitourinary: negative for dysuria    Endocrine: negative for excessive thirst   Skin: negative for rash   Neurological: negative for paralysis   Psychiatric: negative for depression          Physical Exam:    Visit Vitals  /70   Pulse 61   Ht 6' 1\" (1.854 m) Wt 220 lb (99.8 kg)   SpO2 97%   BMI 29.03 kg/m²      Constitutional:  Patient is well developed, well nourished, and not distressed. Ambulated into the room with a normal steady gait. HEENT: atraumatic, normocephalic, wearing a mask. Eyes:   Cunjunctivae clear, no scleral icterus  Neck:   No JVD present. Cardiovascular:  Normal rate, regular rhythm, normal heart sounds. No murmur heard. No ectopy noted  Pulmonary/Chest: Effort normal .  Abdomen-soft nontender, active bowel sounds, no abdominal bruits or thrills appreciated  Extremities: Normal range of motion. Neurovascular intact to both soft and deep sensation bilaterally. Albeit slightly diminished palpable pedal pulses noted bilaterally. No calf tenderness to palpation bilaterally. Some mild chronic hemosiderin staining noted bilaterally. Neurological:  he  is alert and oriented x3 . Gait normal. Motor & sensory grossly intact in all 4 limbs. Psych: Appropriate mood and affect. Skin:  Skin is warm and dry. No ulcerations  Pulses: Femoral -palpable bilaterally                Popliteal- palpable bilaterally                DP/PT-palpable bilaterally-easily obtain triphasic Doppler signals noted bilaterally. Carotid: No carotid bruits or thrills appreciated bilaterally. Plan:   Instructed to continue present Rx-maintain daily meds as ordered continue to monitor blood pressure closely. Patient instructed to start daily aspirin 81 mg daily, patient agreeable. Patient to discuss with his primary care about statin therapy. Patient educated on signs symptoms of aneurysmal disease. Patient educated on signs symptoms of claudication and acute limb ischemia. Previsit imaging reviewed, results discussed with patient.   EXAM: CTA CHEST WITH AND WITHOUT ENHANCEMENT  AORTIC ARTERIAL EXAM WITH MAXIMUM INTENSITY PROJECTIONS (MIPS)     CLINICAL HISTORY/INDICATION: Outside evaluation demonstrated mildly dilated  ascending aorta with maximum diameter of 4.1 cm, follow-up . COMPARISON: None. TECHNIQUE: 2.5 mm collimation axial images obtained from the thoracic inlet to  the diaphragm first without IV contrast and then following the uneventful  administration of 100 cc nonionic intravenous contrast. Imaging performed during  maximum aortic enhancement and is therefore suboptimal for evaluating solid  organs and bowel. Raw data reviewed along with three-dimensional volume rendered images and  maximum intensity projection images to better evaluate the tortuous vascular  structures. Coronal and sagittal MIPs  were obtained to better evaluate the  pulmonary arteries in a three dimensional angiographic method to evaluate for  possible pulmonary emboli. All CT scans at this facility are performed using dose optimization technique as  appropriate to a performed exam, to include automated exposure control,  adjustment of the mA and/or kV according to patient's size (including  appropriate matching for site-specific examinations), or use of iterative  reconstruction technique. FINDINGS:     Included thyroid unremarkable. Main pulmonary artery normal in 2.5 cm in diameter . No adenopathy. 7 x 5 mm soft tissue pulmonary nodule right lower lobe superior medially  abutting the pleura. Axial image 24. Patient motion at the lung bases limits evaluation. Possible additional  pulmonary nodule inferomedially in the right lower lobe image 36 and image 43. There are no pleural effusions. 3.1 cm heterogeneously enhancing left adrenal mass. Axial image 129 mild  thickening of the right adrenal gland. Multiple surgical clips along the right hepatic edge. Enlarged spleen measuring 16 x 8.7 cm. The chest wall soft tissues are unremarkable. Reconstructions: Coronal and sagittal MIPs ( maximum intensity projections) were  obtained from the axial acquisition. The pulmonary arteries branch normally. There are no filling defects. Aorta CTA -     Minimal scattered calcified plaque at the thoracic aorta and coronary arteries. Aortic root at the sinus of Valsalva measures 3.3 x 3.5 cm. At the sinotubular junction diameter of 2.8 x 2.9 cm. Maximum ascending aorta diameter 3.9 x 4 cm. Mid arch diameter 3 x 3.5 cm. Proximal descending aorta measures 2.9 x 3 cm. Mid descending thoracic aorta measures 2.8 x 2.8 cm. Descending aorta at the hiatus measures 2.3 x 2.5 cm. Origins of the brachial cephalic, left common carotid and left subclavian artery  appear widely patent. There is severe stenosis of the proximal subclavian artery with soft plaque. The  lumen measures 3 mm with a diameter of the vessel at 1.1 cm. Axial image 12  series 3. Origins of the celiac artery and superior mesenteric artery appear widely  patent. IMPRESSION     Mild fusiform dilatation of the ascending aorta with maximum diameter of 3.9 x 4  cm. No evidence of dissection. Severe narrowing left subclavian artery just proximal to the origin of the  vertebral artery with the lumen narrowed to 3 mm. Left adrenal 3.1 cm mass. Recommend correlation with blood chemistries. Neoplasm  not excluded. At least one right lower lobe pulmonary nodule. Marked patient respiratory  motion obscures detail at the lung bases and there is a suggestion of multiple  additional pulmonary nodules. CT of the dry chest is recommended. Patient instructed to given above findings we will continue with observation and surveillance of his 3.9 x 4 cm ascending aneurysm. Brief discussion held with patient about aneurysmal disease and indications for treatment. Thus far patient will continue with observation and surveillance. Will also continue to follow patient up with bilateral lower extremity ABIs for his peripheral vascular disease. Follow-up in 6 months with repeat CT imaging of the chest, and ABIs.   Also on next visit we will check an abdominal duplex to see if there is any descending component to his aneurysm. In the meantime patient encouraged to make better lifestyle choices, better nutritional choices, increasing his daily activity as tolerated, and of course smoking cessation. Patient states he understands above, is more than willing to proceed as planned. I will discuss details with my attending. The treatment plan was reviewed with the patient in detail. The patient voiced understanding of this plan and all questions and concerns were addressed. The patient agrees with this plan. We discussed the signs and symptoms that would require earlier attention or intervention. I appreciate the opportunity to participate in the care of your patient. I will be sure to keep you informed of any subsequent changes in the treatment plan. If you have any questions or concerns, please feel free to contact me.       Ramandeep Pope PA-C  Vascular Physician Assistant  [phone]

## 2023-01-23 ENCOUNTER — OFFICE VISIT (OUTPATIENT)
Dept: HEMATOLOGY | Age: 73
End: 2023-01-23
Payer: MEDICARE

## 2023-01-23 VITALS
SYSTOLIC BLOOD PRESSURE: 158 MMHG | HEIGHT: 72 IN | BODY MASS INDEX: 30.07 KG/M2 | HEART RATE: 71 BPM | DIASTOLIC BLOOD PRESSURE: 75 MMHG | OXYGEN SATURATION: 98 % | TEMPERATURE: 97.5 F | WEIGHT: 222 LBS

## 2023-01-23 DIAGNOSIS — Z79.60 LONG-TERM USE OF IMMUNOSUPPRESSANT MEDICATION: ICD-10-CM

## 2023-01-23 DIAGNOSIS — Z94.4 LIVER TRANSPLANT RECIPIENT (HCC): Primary | ICD-10-CM

## 2023-01-23 DIAGNOSIS — Z86.19 HEPATITIS C VIRUS INFECTION CURED AFTER ANTIVIRAL DRUG THERAPY: ICD-10-CM

## 2023-01-23 DIAGNOSIS — Z79.899 LONG TERM CURRENT USE OF IMMUNOSUPPRESSIVE DRUG: ICD-10-CM

## 2023-01-23 DIAGNOSIS — F10.91 ALCOHOL USE DISORDER IN REMISSION: ICD-10-CM

## 2023-01-23 NOTE — Clinical Note
2/12/2023    Patient: Sangita Fernandez   YOB: 1950   Date of Visit: 1/23/2023     Edison Andrea, [address]  [address] [place] [address]    Dear Edison Andrea MD,      Thank you for referring Mr. Jennifer Wadsworth to [address] [place] [address],[address] for evaluation. My notes for this consultation are attached. If you have questions, please do not hesitate to call me. I look forward to following your patient along with you.       Sincerely,    Anival Sheikh MD

## 2023-01-23 NOTE — PROGRESS NOTES
2011 LDLT at [facility]. Moved 2017 to [facility] followed by Jett Miller. Moved to [facility] 2022. ETOH, HCV. HCV cured after LT. Last saw Jett Miller 2022. LT Negin    TAC 1 BID  Myfortic 360 BID    Fatigue   Mild limitations    Dad  cancer  Mom  heart    Recently  2022  5 1 . Alot   No since   Wine 2-3 galsses every 2-3 weeks.   6 glasses per month   [facility]. He moved to [place], [place] in 8/2022    The patient is taking the following for immune suppression: Tacrolimus, Myfortic    In the office today the patient has the following symptoms:  Fatigue    The patient is not experiencing the following symptoms which are commonly seen with this liver disorder:   pain in the right side over the liver,     The patient has mild limitations in daily activities       ASSESSMENT AND PLAN:  Liver transplant   The patient [place] [facility] [place] [facility]. 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 Liver transaminases are normal.  ALP is normal.  Liver function is normal.  The platelet count is depressed. Chronic HCV Treatment  The patient has been treated for HCV with Epclusa (sofosbuvir and velpatasvir)   The patient has achieved sustained virologic response/cure. The last HCV RNA is not available. Will test for HCV RNA to ensure that this has been cured. Immune Suppression  Tacrolimus Is being taken at a dose of 1 mg BID. This is causing no significant adverse effects. The immune suppression blood level is in the proper range. Will continue the current dose. Myfortic Is being taken at a dose of 300 mg BID. This is causing no significant side effects. Will continue the current dose     Prevention of infections  The patient is taking Dapsone to prevent PCP pneumonia. Unclear why he is on this 12 years after LT. Will DC this    Acute and chronic kidney injury   This is a common adverse event of immune suppression. The Screat is elevated. This is most likely secondary to immune suppression level being too high. The elevation in Screat appears stable. Aspirin and NSAIDs should be avoided since these agents can worsen renal insufficiency. Hypercholesterolemia   This can be caused by immune suppression.     Serum cholesterol is normal and does not need to be treated at this time. Hypertension   This is a common side effect of immune suppression. Blood pressure is well controlled on the current treatment. Low serum magnesium   This is a common side effect of immune suppression. The patient has a normal or near normal magnesium level and does not need supplementation. Osteoporosis   Osteoporosis is common in patients with cirhrosis prior to liver transplant. Will get DEXA scan to evalaute jenelle desnity and deterine if any treatment is needed for osteoporosis    Monitoring for skin Cancer  The patient was counseled regarding increased risk of skin cancer in transplant recipients and need to have any new skin lesions evaluated by dermatology and removed if suspicious. The patient was instructed to see Dermatology annually examination. Vaccinations  Routine vaccinations against other bacterial and viral agents can be performed as long as this is with attentuatted virus. Live virus vaccines should not be administered. Annual flu vaccination should be administered. The patient has received 2 doses of COVID-19 vaccine. ALLERGIES  No Known Allergies    MEDICATIONS  Current Outpatient Medications   Medication Sig    tadalafiL (CIALIS) 10 mg tablet Take 1 Tablet by mouth daily as needed for Erectile Dysfunction. amLODIPine (NORVASC) 5 mg tablet Take 1 Tablet by mouth daily. tacrolimus (PROGRAF) 1 mg capsule Take 1 mg by mouth two (2) times a day. enalapril (VASOTEC) 10 mg tablet Take 1 Tablet by mouth daily. metoprolol tartrate (LOPRESSOR) 25 mg tablet Take 1 Tablet by mouth two (2) times a day. omeprazole (PRILOSEC) 40 mg capsule Take 40 mg by mouth daily. calcium citrate 200 mg (950 mg) tablet Take 1 Tablet by mouth daily. docusate sodium (Colace) 100 mg capsule Take 1 Capsule by mouth.    polyethylene glycol (MIRALAX) 17 gram/dose powder Take 17 g by mouth. dapsone 100 mg tablet Take 100 mg by mouth daily.      No current facility-administered medications for this visit. SYSTEM REVIEW NOT RELATED TO LIVER DISEASE OR REVIEWED ABOVE:  Constitution systems: Negative for fever, chills, weight gain, weight loss. Eyes: Negative for visual changes. ENT: Negative for sore throat, painful swallowing. Respiratory: Negative for cough, hemoptysis, SOB. Cardiology: Negative for chest pain, palpitations. GI:  Negative for constipation or diarrhea. : Negative for urinary frequency, dysuria, hematuria, nocturia. Skin: Negative for rash. Hematology: Negative for easy bruising, blood clots. Musculo-skelatal: Negative for back pain, muscle pain, weakness. Neurologic: Negative for headaches, dizziness, vertigo, memory problems not related to HE. Psychology: Negative for anxiety, depression. FAMILY HISTORY:  The father  of cancer. The mother  of heart disease. There is no family history of liver disease. SOCIAL HISTORY:  The patient was  in 2022  The patient has 5 children,  1 is , and a lot of  grandchildren. The patient stopped using tobacco products in   The patient consumes 2-3 alcoholic beverages 2-3 days per week. About 6 glasses per month  The patient currently works full time as . PHYSICAL EXAMINATION:  Visit Vitals  BP (!) 158/75   Pulse 71   Temp 97.5 °F (36.4 °C)   Ht 6' (1.829 m)   Wt 222 lb (100.7 kg)   SpO2 98%   BMI 30.11 kg/m²     General: No acute distress. Eyes: Sclera anicteric. ENT: No oral lesions. Thyroid normal.  Nodes: No adenopathy. Skin: No spider angiomata. No jaundice. No palmar erythema. Respiratory: Lungs clear to auscultation. Cardiovascular: Regular heart rate. No murmurs. No JVD. Abdomen: Normal liver transplant incision that is well healed. Soft non-tender. Liver size normal to percussion/palpation. Spleen not palpable. No obvious ascites. Extremities: No edema. Neurologic: Alert and oriented.   Cranial nerves grossly intact. LABORATORY STUDIES:  Liver [place] of [address] Units 2/10/2023   WBC 4.0 - 11.0 1000/mm3 4.8   HGB 12.4 - 17.2 gm/dl 13.0    - 450 1000/mm3 100 (L)   AST 0.0 - 33.9 U/L 28.0   ALT 10 - 49 U/L 24   Alk Phos 46 - 116 U/L 75   Bili, Total 0.30 - 1.20 mg/dl 1.00   Bili, Direct 0.0 - 0.3 mg/dl 0.3   Albumin 3.4 - 5.0 gm/dl 4.1   BUN 9 - 23 mg/dl 17   Creat 0.70 - 1.30 mg/dl 1.38 (H)   Creat (iSTAT) 0.6 - 1.3 MG/DL    Na 136 - 145 mEq/L 137   K 3.5 - 5.1 mEq/L 4.1   Cl 98 - 107 mEq/L 104   CO2 20 - 31 mEq/L 26   Glucose 74 - 106 mg/dl 174 (H)   Magnesium 1.6 - 2.6 mg/dl 1.9       SEROLOGIES:  Not available or performed. Testing was performed today. LIVER HISTOLOGY:  Not available or performed    ENDOSCOPIC PROCEDURES:  Not available or performed    RADIOLOGY:  Not available or performed    OTHER TESTING:  Not available or performed    FOLLOW-UP:  All of the issues listed above in the Assessment and Plan were discussed with the patient. All questions were answered. The patient expressed a clear understanding of the above. Laboratory studies should be performed every 3 months to assess liver graft function and blood levels of immune suppression. Will need to check for HCV [address] 3 months.       Kayleen Brand MD  [address] [facility] [place]  [facility]  [phone]  FAX:  [phone]

## 2023-02-03 ENCOUNTER — TELEPHONE (OUTPATIENT)
Dept: HEMATOLOGY | Age: 73
End: 2023-02-03

## 2023-02-03 NOTE — TELEPHONE ENCOUNTER
[email or web address] Spoke with Cheikh (Barbra Maddox) concerning patient missed 3 days of medication and has no restarted in will need labs within 3 weeks. Phone number to Kit Newsome [phone]). Will forward message to THE [facility] office. (KF)

## 2023-02-03 NOTE — TELEPHONE ENCOUNTER
Called wanting to speak to a nurse regarding lab results, future plans, medications. Would not elaborate as she states, \"It would be easier to speak over the phone about this. \"

## 2023-02-06 ENCOUNTER — TELEPHONE (OUTPATIENT)
Dept: HEMATOLOGY | Age: 73
End: 2023-02-06

## 2023-02-06 NOTE — TELEPHONE ENCOUNTER
Patient called with questions regarding his tacrolimus level from 1/20/23, which was <1.0. Patient stated he was out of his medication for at least 3 days and was not following a 12 hour trough when having his labs drawn. Patient now has his prescription filled and understands how to do the 12 hour trough. I have asked him to have his labs redrawn at the [facility] on Thursday, 2/9/23, since he missed a dose last night. Patient verbally confirmed understanding.

## 2023-02-12 PROBLEM — Z79.60 LONG-TERM USE OF IMMUNOSUPPRESSANT MEDICATION: Status: ACTIVE | Noted: 2023-02-12

## 2023-02-12 PROBLEM — I10 HYPERTENSION: Status: ACTIVE | Noted: 2023-02-12

## 2023-02-12 PROBLEM — Z94.4 LIVER TRANSPLANT RECIPIENT (HCC): Status: ACTIVE | Noted: 2023-02-12

## 2023-02-12 PROBLEM — F10.91 ALCOHOL USE DISORDER IN REMISSION: Status: ACTIVE | Noted: 2023-02-12

## 2023-02-12 PROBLEM — T86.40 LIVER TRANSPLANT COMPLICATION (HCC): Status: ACTIVE | Noted: 2023-02-12

## 2023-02-12 PROBLEM — Z86.19 HEPATITIS C VIRUS INFECTION CURED AFTER ANTIVIRAL DRUG THERAPY: Status: ACTIVE | Noted: 2023-02-12

## 2023-02-13 ENCOUNTER — TELEPHONE (OUTPATIENT)
Facility: CLINIC | Age: 73
End: 2023-02-13

## 2023-02-13 NOTE — TELEPHONE ENCOUNTER
Patient called in asking for a referral be sent to a urologist     [facility]. [phone]    Please inform patient once completed. [phone]. Thank you.

## 2023-02-16 ENCOUNTER — TELEPHONE (OUTPATIENT)
Age: 73
End: 2023-02-16

## 2023-02-16 DIAGNOSIS — Z94.4 LIVER TRANSPLANT RECIPIENT (HCC): ICD-10-CM

## 2023-02-16 DIAGNOSIS — Z79.60 LONG-TERM USE OF IMMUNOSUPPRESSANT MEDICATION: Primary | ICD-10-CM

## 2023-02-16 RX ORDER — MYCOPHENOLIC ACID 360 MG/1
360 TABLET, DELAYED RELEASE ORAL 2 TIMES DAILY
Qty: 120 TABLET | Refills: 3 | Status: SHIPPED | OUTPATIENT
Start: 2023-02-16

## 2023-02-16 NOTE — TELEPHONE ENCOUNTER
Patient called requesting a refill of myfortic 360 mg BID.  Eprescribed to [facility] per his request.

## 2023-02-16 NOTE — TELEPHONE ENCOUNTER
Patient called requesting a refill on myfortic 360 mg BID. [facility] faxed over a prior auth request. Called patients insurance who stated the pharmacy needs to bill MCR Part B instead of Part D. Informed patient and wife, both of whom stated they will contact the pharmacy. Patient picked up 30 day supply for $21 since going out of town.

## 2023-02-21 ENCOUNTER — HOSPITAL ENCOUNTER (OUTPATIENT)
Facility: HOSPITAL | Age: 73
Setting detail: SPECIMEN
Discharge: HOME | End: 2023-02-24
Payer: MEDICARE

## 2023-02-21 ENCOUNTER — OFFICE VISIT (OUTPATIENT)
Facility: CLINIC | Age: 73
End: 2023-02-21
Payer: MEDICARE

## 2023-02-21 VITALS
BODY MASS INDEX: 29.82 KG/M2 | OXYGEN SATURATION: 94 % | WEIGHT: 220.2 LBS | DIASTOLIC BLOOD PRESSURE: 74 MMHG | SYSTOLIC BLOOD PRESSURE: 151 MMHG | HEART RATE: 66 BPM | TEMPERATURE: 97.7 F | RESPIRATION RATE: 16 BRPM | HEIGHT: 72 IN

## 2023-02-21 DIAGNOSIS — N52.01 ERECTILE DYSFUNCTION DUE TO ARTERIAL INSUFFICIENCY: ICD-10-CM

## 2023-02-21 DIAGNOSIS — R68.82 DECREASED LIBIDO: ICD-10-CM

## 2023-02-21 DIAGNOSIS — I10 ESSENTIAL (PRIMARY) HYPERTENSION: ICD-10-CM

## 2023-02-21 DIAGNOSIS — Z71.89 ADVANCE CARE PLANNING: ICD-10-CM

## 2023-02-21 DIAGNOSIS — Z00.00 MEDICARE ANNUAL WELLNESS VISIT, SUBSEQUENT: ICD-10-CM

## 2023-02-21 DIAGNOSIS — K70.30 ALCOHOLIC CIRRHOSIS OF LIVER WITHOUT ASCITES (HCC): Primary | ICD-10-CM

## 2023-02-21 DIAGNOSIS — Z94.4 S/P LIVER TRANSPLANT (HCC): ICD-10-CM

## 2023-02-21 LAB
EST. AVERAGE GLUCOSE BLD GHB EST-MCNC: 134 MG/DL
HBA1C MFR BLD: 6.3 % (ref 4.2–5.6)

## 2023-02-21 PROCEDURE — G8482 FLU IMMUNIZE ORDER/ADMIN: HCPCS | Performed by: FAMILY MEDICINE

## 2023-02-21 PROCEDURE — 84402 ASSAY OF FREE TESTOSTERONE: CPT

## 2023-02-21 PROCEDURE — G8417 CALC BMI ABV UP PARAM F/U: HCPCS | Performed by: FAMILY MEDICINE

## 2023-02-21 PROCEDURE — 1123F ACP DISCUSS/DSCN MKR DOCD: CPT | Performed by: FAMILY MEDICINE

## 2023-02-21 PROCEDURE — 83036 HEMOGLOBIN GLYCOSYLATED A1C: CPT

## 2023-02-21 PROCEDURE — 36415 COLL VENOUS BLD VENIPUNCTURE: CPT

## 2023-02-21 PROCEDURE — 99214 OFFICE O/P EST MOD 30 MIN: CPT | Performed by: FAMILY MEDICINE

## 2023-02-21 PROCEDURE — 3078F DIAST BP <80 MM HG: CPT | Performed by: FAMILY MEDICINE

## 2023-02-21 PROCEDURE — G0439 PPPS, SUBSEQ VISIT: HCPCS | Performed by: FAMILY MEDICINE

## 2023-02-21 PROCEDURE — G8428 CUR MEDS NOT DOCUMENT: HCPCS | Performed by: FAMILY MEDICINE

## 2023-02-21 PROCEDURE — 3077F SYST BP >= 140 MM HG: CPT | Performed by: FAMILY MEDICINE

## 2023-02-21 PROCEDURE — 3017F COLORECTAL CA SCREEN DOC REV: CPT | Performed by: FAMILY MEDICINE

## 2023-02-21 PROCEDURE — 1036F TOBACCO NON-USER: CPT | Performed by: FAMILY MEDICINE

## 2023-02-21 RX ORDER — ENALAPRIL MALEATE 20 MG/1
20 TABLET ORAL DAILY
Qty: 90 TABLET | Refills: 3 | Status: SHIPPED | OUTPATIENT
Start: 2023-02-21

## 2023-02-21 RX ORDER — TADALAFIL 20 MG/1
20 TABLET ORAL DAILY PRN
Qty: 30 TABLET | Refills: 1 | Status: SHIPPED | OUTPATIENT
Start: 2023-02-21

## 2023-02-21 RX ORDER — MYCOPHENOLIC ACID 360 MG/1
360 TABLET, DELAYED RELEASE ORAL 2 TIMES DAILY
Qty: 120 TABLET | Refills: 3 | Status: SHIPPED | OUTPATIENT
Start: 2023-02-21

## 2023-02-21 SDOH — ECONOMIC STABILITY: FOOD INSECURITY: WITHIN THE PAST 12 MONTHS, THE FOOD YOU BOUGHT JUST DIDN'T LAST AND YOU DIDN'T HAVE MONEY TO GET MORE.: NEVER TRUE

## 2023-02-21 SDOH — ECONOMIC STABILITY: HOUSING INSECURITY
IN THE LAST 12 MONTHS, WAS THERE A TIME WHEN YOU DID NOT HAVE A STEADY PLACE TO SLEEP OR SLEPT IN A SHELTER (INCLUDING NOW)?: NO

## 2023-02-21 SDOH — ECONOMIC STABILITY: FOOD INSECURITY: WITHIN THE PAST 12 MONTHS, YOU WORRIED THAT YOUR FOOD WOULD RUN OUT BEFORE YOU GOT MONEY TO BUY MORE.: NEVER TRUE

## 2023-02-21 SDOH — ECONOMIC STABILITY: INCOME INSECURITY: HOW HARD IS IT FOR YOU TO PAY FOR THE VERY BASICS LIKE FOOD, HOUSING, MEDICAL CARE, AND HEATING?: NOT HARD AT ALL

## 2023-02-21 ASSESSMENT — PATIENT HEALTH QUESTIONNAIRE - PHQ9
SUM OF ALL RESPONSES TO PHQ QUESTIONS 1-9: 0
1. LITTLE INTEREST OR PLEASURE IN DOING THINGS: 0
2. FEELING DOWN, DEPRESSED OR HOPELESS: 0
SUM OF ALL RESPONSES TO PHQ QUESTIONS 1-9: 0
SUM OF ALL RESPONSES TO PHQ9 QUESTIONS 1 & 2: 0
SUM OF ALL RESPONSES TO PHQ QUESTIONS 1-9: 0
SUM OF ALL RESPONSES TO PHQ QUESTIONS 1-9: 0

## 2023-02-21 ASSESSMENT — VISUAL ACUITY
OD_CC: 20/25
OS_CC: 20/30

## 2023-02-21 ASSESSMENT — LIFESTYLE VARIABLES
HOW OFTEN DO YOU HAVE A DRINK CONTAINING ALCOHOL: NEVER
HOW MANY STANDARD DRINKS CONTAINING ALCOHOL DO YOU HAVE ON A TYPICAL DAY: PATIENT DOES NOT DRINK

## 2023-02-21 NOTE — PATIENT INSTRUCTIONS
Advance Directives: Care Instructions  Overview  An advance directive is a legal way to state your wishes at the end of your life. It tells your family and your doctor what to do if you can't say what you want. There are two main types of advance directives. You can change them any time your wishes change. Living will. This form tells your family and your doctor your wishes about life support and other treatment. The form is also called a declaration. Medical power of . This form lets you name a person to make treatment decisions for you when you can't speak for yourself. This person is called a health care agent (health care proxy, health care surrogate). The form is also called a durable power of  for health care. If you do not have an advance directive, decisions about your medical care may be made by a family member, or by a doctor or a  who doesn't know you. It may help to think of an advance directive as a gift to the people who care for you. If you have one, they won't have to make tough decisions by themselves. For more information, including forms for your state, see the [address] website (www.caringinfo.org/planning/advance-directives/). Follow-up care is a key part of your treatment and safety. Be sure to make and go to all appointments, and call your doctor if you are having problems. It's also a good idea to know your test results and keep a list of the medicines you take. What should you include in an advance directive? Many states have a unique advance directive form. (It may ask you to address specific issues.) Or you might use a universal form that's approved by many states. If your form doesn't tell you what to address, it may be hard to know what to include in your advance directive. Use the questions below to help you get started. · Who do you want to make decisions about your medical care if you are not able to?   · What life-support measures do you want if you have a serious illness that gets worse over time or can't be cured? · What are you most afraid of that might happen? (Maybe you're afraid of having pain, losing your independence, or being kept alive by machines.)  · Where would you prefer to die? (Your home? A hospital? A nursing home?)  · Do you want to donate your organs when you die? · Do you want certain Sabianism practices performed before you die? When should you call for help? Be sure to contact your doctor if you have any questions. Where can you learn more? Go to http://www.gardner.com/ and enter R264 to learn more about \"Advance Directives: Care Instructions. \"  Current as of: June 16, 2022               Content Version: 13.5  © 4533-2452 [employer]. Care instructions adapted under license by [facility] ([facility]). If you have questions about a medical condition or this instruction, always ask your healthcare professional. [place] [address] any warranty or liability for your use of this information. A Healthy Heart: Care Instructions  Your Care Instructions     Coronary artery disease, also called heart disease, occurs when a substance called plaque builds up in the vessels that supply oxygen-rich blood to your heart muscle. This can narrow the blood vessels and reduce blood flow. A heart attack happens when blood flow is completely blocked. A high-fat diet, smoking, and other factors increase the risk of heart disease. Your doctor has found that you have a chance of having heart disease. You can do lots of things to keep your heart healthy. It may not be easy, but you can change your diet, exercise more, and quit smoking. These steps really work to lower your chance of heart disease. Follow-up care is a key part of your treatment and safety. Be sure to make and go to all appointments, and call your doctor if you are having problems.  It's also a good idea to know your test results and keep a list of the medicines you take. How can you care for yourself at home? Diet    · Use less salt when you cook and eat. This helps lower your blood pressure. Taste food before salting. Add only a little salt when you think you need it. With time, your taste buds will adjust to less salt.     · Eat fewer snack items, fast foods, canned soups, and other high-salt, high-fat, processed foods.     · Read food labels and try to avoid saturated and trans fats. They increase your risk of heart disease by raising cholesterol levels.     · Limit the amount of solid fat-butter, margarine, and shortening-you eat. Use olive, peanut, or canola oil when you cook. Bake, broil, and steam foods instead of frying them.     · Eat a variety of fruit and vegetables every day. Dark green, deep orange, red, or yellow fruits and vegetables are especially good for you. Examples include spinach, carrots, peaches, and berries.     · Foods high in fiber can reduce your cholesterol and provide important vitamins and minerals. High-fiber foods include whole-grain cereals and breads, oatmeal, beans, brown rice, citrus fruits, and apples.     · Eat lean proteins. Heart-healthy proteins include seafood, lean meats and poultry, eggs, beans, peas, nuts, seeds, and soy products.     · Limit drinks and foods with added sugar. These include candy, desserts, and soda pop. Lifestyle changes    · If your doctor recommends it, get more exercise. Walking is a good choice. Bit by bit, increase the amount you walk every day. Try for at least 30 minutes on most days of the week. You also may want to swim, bike, or do other activities.     · Do not smoke. If you need help quitting, talk to your doctor about stop-smoking programs and medicines. These can increase your chances of quitting for good. Quitting smoking may be the most important step you can take to protect your heart.  It is never too late to quit.     · Limit alcohol to 2 drinks a day for men and 1 drink a day for women. Too much alcohol can cause health problems.     · Manage other health problems such as diabetes, high blood pressure, and high cholesterol. If you think you may have a problem with alcohol or drug use, talk to your doctor. Medicines    · Take your medicines exactly as prescribed. Call your doctor if you think you are having a problem with your medicine.     · If your doctor recommends aspirin, take the amount directed each day. Make sure you take aspirin and not another kind of pain reliever, such as acetaminophen (Tylenol). When should you call for help? Call 911 if you have symptoms of a heart attack. These may include:    · Chest pain or pressure, or a strange feeling in the chest.     · Sweating.     · Shortness of breath.     · Pain, pressure, or a strange feeling in the back, neck, jaw, or upper belly or in one or both shoulders or arms.     · Lightheadedness or sudden weakness.     · A fast or irregular heartbeat. After you call 911, the  may tell you to chew 1 adult-strength or 2 to 4 low-dose aspirin. Wait for an ambulance. Do not try to drive yourself. Watch closely for changes in your health, and be sure to contact your doctor if you have any problems. Where can you learn more? Go to http://www.gardner.com/ and enter F075 to learn more about \"A Healthy Heart: Care Instructions. \"  Current as of: September 7, 2022               Content Version: 13.5  © 2006-2022 [employer]. Care instructions adapted under license by [facility] ([facility]). If you have questions about a medical condition or this instruction, always ask your healthcare professional. [place] [address] any warranty or liability for your use of this information. Personalized Preventive Plan for Caye Hem - 2/21/2023  Medicare offers a range of preventive health benefits.  Some of the tests and screenings are paid in full while other may be subject to a deductible, co-insurance, and/or copay. Some of these benefits include a comprehensive review of your medical history including lifestyle, illnesses that may run in your family, and various assessments and screenings as appropriate. After reviewing your medical record and screening and assessments performed today your provider may have ordered immunizations, labs, imaging, and/or referrals for you. A list of these orders (if applicable) as well as your Preventive Care list are included within your After Visit Summary for your review. Other Preventive Recommendations:    A preventive eye exam performed by an eye specialist is recommended every 1-2 years to screen for glaucoma; cataracts, macular degeneration, and other eye disorders. A preventive dental visit is recommended every 6 months. Try to get at least 150 minutes of exercise per week or 10,000 steps per day on a pedometer . Order or download the FREE \"Exercise & Physical Activity: Your Everyday Guide\" from [employer]. Call 9-939.641.6365 or search [employer] online. You need 4163-0759 mg of calcium and 9084-3997 IU of vitamin D per day. It is possible to meet your calcium requirement with diet alone, but a vitamin D supplement is usually necessary to meet this goal.  When exposed to the sun, use a sunscreen that protects against both UVA and UVB radiation with an SPF of 30 or greater. Reapply every 2 to 3 hours or after sweating, drying off with a towel, or swimming. Always wear a seat belt when traveling in a car. Always wear a helmet when riding a bicycle or motorcycle.

## 2023-02-21 NOTE — PROGRESS NOTES
Doug Sesay is a 74 y.o.  male and presents with    Chief Complaint   Patient presents with    Medicare AWV     Subjective: Well Adult Physical   Patient here for a comprehensive physical exam.The patient reports problems - low libido, erectile dysfunction which does not respond to medication and he is requesting referral to urologist.  Do you take any herbs or supplements that were not prescribed by a doctor? no Are you taking calcium supplements? no Are you taking aspirin daily? not applicable    Hypertension  Patient is here for evaluation of hypertension. He indicates that he is feeling well and denies any symptoms referable to his elevated blood pressure. He has noted elevated blood pressure over the past several days. It is higher in the morning. Specifically denies chest pain, palpitations, dyspnea, orthopnea, PND or peripheral edema. Current medication regimen is as listed   below. Patient has these side effects of medication:  none . Cardiovascular risk factors: smoking/ tobacco exposure, dyslipidemia, male gender, hypertension Use of agents associated with hypertension: none History of renal disease: positive  History of flank trauma: negative     He has history of liver transplant with his brother as his donor.       ROS   General ROS: negative for - chills, fatigue, or fever  Psychological ROS: negative for - anxiety or depression  Ophthalmic ROS: positive for - blurry vision and uses glasses  ENT ROS: negative for - headaches  Endocrine ROS: negative for - polydipsia/polyuria or temperature intolerance  Respiratory ROS: no cough, shortness of breath, or wheezing  Cardiovascular ROS: no chest pain or dyspnea on exertion  Gastrointestinal ROS: no abdominal pain, change in bowel habits, or black or bloody stools  Genito-Urinary ROS: no dysuria, trouble voiding, or hematuria  Musculoskeletal ROS: negative for - joint pain  Neurological ROS: negative for - weakness; positive for - numbness in his toes at night  Dermatological ROS: negative for - rash or skin lesion changes    All other systems reviewed and are negative. Objective:    BP (!) 151/74 (Site: Left Upper Arm)   Pulse 66   Temp 97.7 °F (36.5 °C) (Temporal)   Resp 16   Ht 6' (1.829 m)   Wt 220 lb 3.2 oz (99.9 kg)   SpO2 94%   BMI 29.86 kg/m²     General Appearance:  Alert, cooperative, no distress, appears stated age   Head:  Normocephalic, without obvious abnormality, atraumatic   Eyes:  PERRL, conjunctiva/corneas clear, EOM's intact, fundi benign, both eyes   Ears:  Normal TM's and external ear canals, both ears   Nose: Nares normal, septum midline, mucosa normal, no drainage or sinus tenderness   Throat: Lips, mucosa, and tongue normal; teeth and gums normal   Neck: Supple, symmetrical, trachea midline, no adenopathy, thyroid: not enlarged, symmetric, no tenderness/mass/nodules, no carotid bruit or JVD   Back:   Symmetric, no curvature, ROM normal, no CVA tenderness   Lungs:   Clear to auscultation bilaterally, respirations unlabored   Chest Wall:  No tenderness or deformity   Heart:  Regular rate and rhythm, S1, S2 normal, no murmur, rub or gallop   Abdomen:   Soft, non-tender, bowel sounds active all four quadrants,  no masses, no organomegaly   Genitalia:  deferred   Rectal:  deferred   Extremities: Extremities normal, atraumatic, no cyanosis or edema   Pulses: 2+ and symmetric   Skin: Skin color, texture, turgor normal, no rashes or lesions   Lymph nodes: Cervical, supraclavicular, and axillary nodes normal   Neurologic: Normal         LABS     TESTS      Assessment/Plan:    1. Alcoholic cirrhosis of liver without ascites ([facility] [address]  Recovering; s/p transplant    2. Medicare annual wellness visit, subsequent  Reviewed preventive recommendations    3. Advance care planning  See ACP    4. Decreased libido  Assess for hypogonadism  - Testosterone, Free; Future  - External Referral To Urology    5.  Essential (primary) hypertension  Goal <130/80; increase enalipril to 20 mg dose    6. S/P liver transplant ([facility] [address]  Follow up with team    7. Erectile dysfunction due to arterial insufficiency  Increase dose of treatment  - tadalafil (CIALIS) 20 MG tablet; Take 1 tablet by mouth daily as needed for Erectile Dysfunction  Dispense: 30 tablet; Refill: 1       Lab review: orders written for new lab studies as appropriate; see orders      I have discussed the diagnosis with the patient and the intended plan as seen in the above orders. The patient has received an after-visit summary and questions were answered concerning future plans. I have discussed medication side effects and warnings with the patient as well. I have reviewed the plan of care with the patient, accepted their input and they are in agreement with the treatment goals.

## 2023-02-21 NOTE — PROGRESS NOTES
Medicare Annual Wellness Visit    Elayne Dickey is here for Medicare AWV    Assessment & Plan   Alcoholic cirrhosis of liver without ascites ([facility] [address]    Recommendations for Preventive Services Due: see orders and patient instructions/AVS.  Recommended screening schedule for the next 5-10 years is provided to the patient in written form: see Patient Instructions/AVS.     Return in about 6 months (around 8/21/2023) for medication evaluation, result review. Subjective   The following acute and/or chronic problems were also addressed today:  Hypertension  S/p liver transplant      Patient's complete Health Risk Assessment and screening values have been reviewed and are found in Flowsheets. The following problems were reviewed today and where indicated follow up appointments were made and/or referrals ordered. Positive Risk Factor Screenings with Interventions:                    Vision Screen:  Do you have difficulty driving, watching TV, or doing any of your daily activities because of your eyesight?: No     Vision Screening    Right eye Left eye Both eyes   Without correction      With correction 20/25 20/30 20/25       Interventions:   Patient encouraged to make appointment with their eye specialist      Advanced Directives:  Do you have a Living Will?: (!) No    Intervention:  has an advanced directive - a copy HAS NOT been provided. Advance Care Planning   Advanced Care Planning: Discussed the patients choices for care and treatment in case of a health event that adversely affects decision-making abilities. Also discussed the patients long-term treatment options. Reviewed with the patient the appropriate state-specific advance directive documents. Reviewed the process of designating a competent adult as an Agent (or -in-fact) that could take make health care decisions for the patient if incompetent.  Patient was asked to complete the declaration forms, if they have not already, either acknowledge the forms by a public notary or an eligible witness and provide a signed copy to the practice office. Time spent (minutes): 16                      Objective   Vitals:    02/21/23 0848 02/21/23 0852   BP: (!) 168/67 (!) 151/74   Site: Right Upper Arm Left Upper Arm   Position: Sitting    Cuff Size: Medium Adult    Pulse: 66    Resp: 16    Temp: 97.7 °F (36.5 °C)    TempSrc: Temporal    SpO2: 94%    Weight: 220 lb 3.2 oz (99.9 kg)    Height: 6' (1.829 m)       Body mass index is 29.86 kg/m². No Known Allergies  Prior to Visit Medications    Medication Sig Taking?  Authorizing Provider   Mycophenolate Sodium (MYFORTIC) 360 MG TBEC Take 1 tablet by mouth 2 times daily Yes Kaylyn Singh MD   Mycophenolate Sodium (MYFORTIC) 360 MG TBEC Take 1 tablet by mouth 2 times daily Yes Kaylyn Singh MD   amLODIPine (NORVASC) 5 MG tablet Take 5 mg by mouth daily Yes Ar Automatic Reconciliation   calcium citrate (CALCITRATE) 950 (200 Ca) MG tablet Take 1 tablet by mouth daily Yes Ar Automatic Reconciliation   dapsone 100 MG tablet Take 100 mg by mouth daily Yes Ar Automatic Reconciliation   docusate (COLACE, DULCOLAX) 100 MG CAPS Take 1 capsule by mouth Yes Ar Automatic Reconciliation   enalapril (VASOTEC) 10 MG tablet Take 1 tablet by mouth daily Yes Ar Automatic Reconciliation   metoprolol tartrate (LOPRESSOR) 25 MG tablet Take 1 tablet by mouth 2 times daily Yes Ar Automatic Reconciliation   omeprazole (PRILOSEC) 40 MG delayed release capsule Take 40 mg by mouth daily Yes Ar Automatic Reconciliation   polyethylene glycol (GLYCOLAX) 17 GM/SCOOP powder Take 17 g by mouth Yes Ar Automatic Reconciliation   tacrolimus (PROGRAF) 1 MG capsule Take 1 mg by mouth 2 times daily Yes Ar Automatic Reconciliation   tadalafil (CIALIS) 10 MG tablet Take 10 mg by mouth daily as needed Yes Ar Automatic Reconciliation       CareTeam (Including outside providers/suppliers regularly involved in providing care):   Patient Care Team:  Brina Manuel MD as PCP - Roe Suarez MD as PCP - Empaneled Provider  Willie Lugo RN as Registered Nurse     Reviewed and updated this visit:  Tobacco  Allergies  Med Hx  Surg Hx  Soc Hx  Fam Hx             Brina Manuel MD

## 2023-02-21 NOTE — PROGRESS NOTES
Bernard Fabian is a 74 y.o. presents today for   Chief Complaint   Patient presents with    Medicare AWV         Depression Screening:   PHQ-9 Questionaire 2/21/2023 1/23/2023 10/25/2022 10/20/2022   Little interest or pleasure in doing things 0 0 0 0   Feeling down, depressed, or hopeless 0 0 0 0   PHQ-9 Total Score 0 0 0 0       Abuse Screening: AMB Abuse Screening 2/21/2023   Do you ever feel afraid of your partner? N   Are you in a relationship with someone who physically or mentally threatens you? N   Is it safe for you to go home? Y       Learning Assessment:  No question data found. Fall Risk:  Fall Risk 2/21/2023   2 or more falls in past year? no   Fall with injury in past year? no           Coordination of Care:   1. \"Have you been to the ER, urgent care clinic since your last visit? Hospitalized since your last visit? \" yes    2. \"Have you seen or consulted any other health care providers outside of the [address] [place] [address] since your last visit? \" yes    3. For patients aged 39-70: Has the patient had a colonoscopy / FIT/ Cologuard? yes    If the patient is female:    4. For patients aged 41-77: Has the patient had a mammogram within the past 2 years? no    5. For patients aged 21-65: Has the patient had a pap smear? no    Health Maintenance: reviewed and discussed and ordered per Provider.     Health Maintenance Due   Topic Date Due    Hepatitis A vaccine (1 of 2 - Risk 2-dose series) Never done    Hepatitis B vaccine (1 of 3 - Risk 3-dose series) Never done    DTaP/Tdap/Td vaccine (1 - Tdap) Never done    COVID-19 Vaccine (3 - Moderna risk series) 09/29/2021    Annual Wellness Visit (AWV)  02/04/2023

## 2023-02-22 ENCOUNTER — TELEPHONE (OUTPATIENT)
Facility: CLINIC | Age: 73
End: 2023-02-22

## 2023-02-22 ENCOUNTER — TELEPHONE (OUTPATIENT)
Age: 73
End: 2023-02-22

## 2023-02-22 NOTE — TELEPHONE ENCOUNTER
Called patient to inform him his tacrolimus lab was again <1 on 2/10/23. I left a voicemail on Monday, 2/20/23 requesting he have his labs drawn but he must not have heard it. Requested patient have his labs drawn tomorrow at [facility] and to be in lab by 0830 since he takes his evening dose at 9 pm. Patient verbally confirmed understanding.

## 2023-02-22 NOTE — TELEPHONE ENCOUNTER
Pt would like a return call from nurse, very concerned about his blood sugar that is still in the 200 range, would like some info on what he could do to try to lower it.

## 2023-02-23 ENCOUNTER — TELEPHONE (OUTPATIENT)
Age: 73
End: 2023-02-23

## 2023-02-23 DIAGNOSIS — Z94.4 LIVER TRANSPLANT RECIPIENT (HCC): Primary | ICD-10-CM

## 2023-02-23 DIAGNOSIS — Z79.60 LONG-TERM USE OF IMMUNOSUPPRESSANT MEDICATION: ICD-10-CM

## 2023-02-23 NOTE — TELEPHONE ENCOUNTER
Patient called requesting new standing lab orders since the  informed him the orders he had will not work. Emailed lab orders to patient at [email or web address] and faxed lab orders to [facility] and Yousif Sanchez at [facility]. Patient verbally confirmed understanding.

## 2023-02-24 DIAGNOSIS — Z79.60 LONG-TERM USE OF IMMUNOSUPPRESSANT MEDICATION: Primary | ICD-10-CM

## 2023-02-24 DIAGNOSIS — Z94.4 LIVER TRANSPLANT RECIPIENT (HCC): ICD-10-CM

## 2023-02-25 LAB — TESTOST FREE SERPL-MCNC: 7.7 PG/ML (ref 6.6–18.1)

## 2023-02-28 DIAGNOSIS — Z94.4 LIVER TRANSPLANT RECIPIENT (HCC): ICD-10-CM

## 2023-02-28 DIAGNOSIS — Z79.60 LONG-TERM USE OF IMMUNOSUPPRESSANT MEDICATION: Primary | ICD-10-CM

## 2023-02-28 RX ORDER — TACROLIMUS 1 MG/1
2 CAPSULE ORAL 2 TIMES DAILY
Qty: 360 CAPSULE | Refills: 0 | Status: SHIPPED | OUTPATIENT
Start: 2023-02-28 | End: 2023-05-29

## 2023-03-20 RX ORDER — MULTIVITAMIN,THERAPEUTIC
TABLET ORAL
COMMUNITY

## 2023-03-20 RX ORDER — METFORMIN HYDROCHLORIDE 500 MG/1
TABLET, EXTENDED RELEASE ORAL
COMMUNITY
Start: 2021-04-09 | End: 2023-04-06 | Stop reason: ALTCHOICE

## 2023-03-20 RX ORDER — MYCOPHENOLIC ACID 360 MG/1
TABLET, DELAYED RELEASE ORAL
COMMUNITY
Start: 2023-03-10 | End: 2023-04-17 | Stop reason: SDUPTHER

## 2023-03-21 ENCOUNTER — OFFICE VISIT (OUTPATIENT)
Age: 73
End: 2023-03-21
Payer: MEDICARE

## 2023-03-21 VITALS
BODY MASS INDEX: 29.84 KG/M2 | SYSTOLIC BLOOD PRESSURE: 140 MMHG | TEMPERATURE: 97.8 F | WEIGHT: 220 LBS | HEART RATE: 55 BPM | OXYGEN SATURATION: 95 % | DIASTOLIC BLOOD PRESSURE: 62 MMHG

## 2023-03-21 DIAGNOSIS — E78.00 PURE HYPERCHOLESTEROLEMIA: ICD-10-CM

## 2023-03-21 DIAGNOSIS — I10 ESSENTIAL HYPERTENSION WITH GOAL BLOOD PRESSURE LESS THAN 140/90: Primary | ICD-10-CM

## 2023-03-21 PROCEDURE — 1123F ACP DISCUSS/DSCN MKR DOCD: CPT | Performed by: INTERNAL MEDICINE

## 2023-03-21 PROCEDURE — 99214 OFFICE O/P EST MOD 30 MIN: CPT | Performed by: INTERNAL MEDICINE

## 2023-03-21 PROCEDURE — G8428 CUR MEDS NOT DOCUMENT: HCPCS | Performed by: INTERNAL MEDICINE

## 2023-03-21 PROCEDURE — G8417 CALC BMI ABV UP PARAM F/U: HCPCS | Performed by: INTERNAL MEDICINE

## 2023-03-21 PROCEDURE — 1036F TOBACCO NON-USER: CPT | Performed by: INTERNAL MEDICINE

## 2023-03-21 PROCEDURE — 3017F COLORECTAL CA SCREEN DOC REV: CPT | Performed by: INTERNAL MEDICINE

## 2023-03-21 PROCEDURE — 3077F SYST BP >= 140 MM HG: CPT | Performed by: INTERNAL MEDICINE

## 2023-03-21 PROCEDURE — 3078F DIAST BP <80 MM HG: CPT | Performed by: INTERNAL MEDICINE

## 2023-03-21 PROCEDURE — G8482 FLU IMMUNIZE ORDER/ADMIN: HCPCS | Performed by: INTERNAL MEDICINE

## 2023-03-21 RX ORDER — LOSARTAN POTASSIUM 50 MG/1
50 TABLET ORAL DAILY
Qty: 90 TABLET | Refills: 2 | Status: SHIPPED | OUTPATIENT
Start: 2023-03-21

## 2023-03-21 NOTE — PROGRESS NOTES
Identified pt with two pt identifiers(name and ). Reviewed record in preparation for visit and have obtained necessary documentation. Bearl Pedlar presents today for   Chief Complaint   Patient presents with    Follow-up       Pt denied DIZZINESS, SOB, CHEST PAIN/ PRESSURE, FATIGUE/WEAKNESS, HEADACHES, SWELLING. Bearl Pedlar preferred language for health care discussion is english/other. Personal Protective Equipment:   Personal Protective Equipment was used including: mask-surgical and hands-gloves. Patient was placed on no precaution(s). Patient was masked. Precautions:   Patient currently on None  Patient currently roomed with door closed. Is someone accompanying this pt? wife    Is the patient using any DME equipment during [address]? no    Depression Screening:  PHQ-9 Questionaire 2023 2023 10/25/2022 10/20/2022   Little interest or pleasure in doing things 0 0 0 0   Feeling down, depressed, or hopeless 0 0 0 0   PHQ-9 Total Score 0 0 0 0        Learning Assessment:  No question data found. Abuse Screening: AMB Abuse Screening 3/21/2023 2023   Do you ever feel afraid of your partner? N N   Are you in a relationship with someone who physically or mentally threatens you? N N   Is it safe for you to go home? Y Y          Fall Risk  Fall Risk 3/21/2023 2023   2 or more falls in past year? no no   Fall with injury in past year? no no         Pt currently taking Anticoagulant therapy? no  Pt currently taking Antiplatelet therapy? no    Coordination of Care:  1. Have you been to the ER, urgent care clinic since your last visit? Hospitalized since your last visit? no    2. Have you seen or consulted any other health care providers outside of the [address] [place] [address] since your last visit? Include any pap smears or colon screening. no      Please see Red banners under Allergies and Med Rec to remove outside inquires.  All correct information has been verified
tablet by mouth daily    dapsone 100 MG tablet Take 100 mg by mouth daily    docusate (COLACE, DULCOLAX) 100 MG CAPS Take 1 capsule by mouth    polyethylene glycol (GLYCOLAX) 17 GM/SCOOP powder Take 17 g by mouth     No current facility-administered medications for this visit. No past surgical history on file. Allergies and Sensitivities:  No Known Allergies    Family History:  No family history on file. Social History:  Social History     Tobacco Use    Smoking status: Former     Packs/day: 1.00     Types: Cigarettes     Quit date: 11/3/2008     Years since quittin.3    Smokeless tobacco: Never   Substance Use Topics    Alcohol use: Yes    Drug use: Not Currently     He  reports that he quit smoking about 14 years ago. His smoking use included cigarettes. He smoked an average of 1 pack per day. He has never used smokeless tobacco.  He  reports current alcohol use. Physical Exam:  BP Readings from Last 3 Encounters:   23 (!) 140/62   23 (!) 151/74   23 (!) 158/75         Pulse Readings from Last 3 Encounters:   23 55   23 66   23 71          Wt Readings from Last 3 Encounters:   23 220 lb (99.8 kg)   23 220 lb 3.2 oz (99.9 kg)   23 222 lb (100.7 kg)       Constitutional: Oriented to person, place, and time. HENT: Head: Normocephalic and atraumatic. Neck: No JVD present. Left carotid area bruit appreciated  Cardiovascular: Regular rhythm. No murmur, gallop or rubs appreciated  Lung: Breath sounds normal. No respiratory distress. No ronchi or rales appreciated  Abdominal: No tenderness. No rebound and no guarding. Musculoskeletal: There is no lower extremity edema.  No cynosis    LABS:   @  Lab Results   Component Value Date/Time    WBC 5.5 2023 08:03 AM    HGB 13.1 2023 08:03 AM    HCT 41.8 2023 08:03 AM     2023 08:03 AM    MCV 93.5 2023 08:03 AM     Lab Results   Component Value Date/Time

## 2023-03-21 NOTE — PATIENT INSTRUCTIONS
New Medication/Medication Changes    Stop Amlodipine    Stop Enalapril    Start Losartan 50 mg take 1 tab daily     **please allow 24-48 hrs for medication to be escribed to pharmacy** If you need any refills on medications please contact your pharmacy so that the request can be escribed to the provider for review seven to 10 days prior to being out of medication.       New Location Address- projected for the month of May 2023    [address] [place] [facility] [address], [place] [address]

## 2023-03-28 ENCOUNTER — TELEPHONE (OUTPATIENT)
Age: 73
End: 2023-03-28

## 2023-03-28 ENCOUNTER — OFFICE VISIT (OUTPATIENT)
Age: 73
End: 2023-03-28
Payer: MEDICARE

## 2023-03-28 VITALS
BODY MASS INDEX: 29.8 KG/M2 | DIASTOLIC BLOOD PRESSURE: 72 MMHG | WEIGHT: 220 LBS | HEART RATE: 56 BPM | HEIGHT: 72 IN | OXYGEN SATURATION: 96 % | SYSTOLIC BLOOD PRESSURE: 120 MMHG

## 2023-03-28 DIAGNOSIS — I71.21 ANEURYSM OF THE ASCENDING AORTA, WITHOUT RUPTURE (HCC): Primary | ICD-10-CM

## 2023-03-28 PROCEDURE — 3074F SYST BP LT 130 MM HG: CPT | Performed by: SURGERY

## 2023-03-28 PROCEDURE — 1123F ACP DISCUSS/DSCN MKR DOCD: CPT | Performed by: SURGERY

## 2023-03-28 PROCEDURE — 99212 OFFICE O/P EST SF 10 MIN: CPT | Performed by: SURGERY

## 2023-03-28 PROCEDURE — 3078F DIAST BP <80 MM HG: CPT | Performed by: SURGERY

## 2023-03-28 NOTE — PROGRESS NOTES
1. Have you been to the ER, urgent care clinic since your last visit? No  Hospitalized since your last visit? No    2. Have you seen or consulted any other health care providers outside of the [address] [place] [address] since your last visit? Include any pap smears or colon screening.  No
1.00    Types: Cigarettes    Quit date: 11/3/2008    Years since quittin.4   Smokeless Tobacco Never         Review of Systems  No chest pain, no shortness of breath, positive joint pain, no fever. Physical Exam  General: Well-appearing male in no acute distress   HEENT: EOMI, no scleral icterus is noted. Pulmonary: No increased work or breathing is noted. Abdomen:  nondistended. Extremities: Warm and well perfused bilaterally. Vascular: Bilateral Femoral pulses intact,  Neuro: Cranial nerves II through XII are grossly intact   Integument: No ulcerations are identified visibly          An electronic signature was used to authenticate this note.   -- Rupal Castillo MD

## 2023-03-28 NOTE — TELEPHONE ENCOUNTER
Called patient to review recent lab results from 3/27/23. Informed patient his liver labs are great but, his glucose and BUN/Cr are outside of the normal parameters. Inquired if patient was a diabetic. Patient stated he did not know if he is a diabetic. Informed patient his Hemoglobin A1c was 6.3 on 2/21/23 which would mean his blood glucose is outside of the range. Requested patient see his PCP to manage his blood sugars. Called Dr. [facility] office, spoke to [place], informed her of pts high glucose numbers and high Hgb A1c. Sheldon israel set appointment for 4/6/23 @ 0302. Patient stated he would like a new PCP. Called [facility], scheduled an appt for pt on 7/7/23 @ 0857, with Dr. Eduardo Alvares. Patient requested I text him the above appointments since he was driving.

## 2023-03-30 ENCOUNTER — TELEPHONE (OUTPATIENT)
Age: 73
End: 2023-03-30

## 2023-03-30 DIAGNOSIS — Z79.60 LONG-TERM USE OF IMMUNOSUPPRESSANT MEDICATION: ICD-10-CM

## 2023-03-30 DIAGNOSIS — Z94.4 LIVER TRANSPLANT RECIPIENT (HCC): ICD-10-CM

## 2023-03-30 RX ORDER — TACROLIMUS 1 MG/1
2 CAPSULE ORAL 2 TIMES DAILY
Qty: 360 CAPSULE | Refills: 3 | Status: SHIPPED | OUTPATIENT
Start: 2023-03-30 | End: 2023-06-28

## 2023-03-30 NOTE — TELEPHONE ENCOUNTER
Patient LVM requesting a refill on his tacrolimus 2mg BID. Called patient and informed him the Rx was e-prescribed to his pharmacy with 3 refills. Informed patient the tacrolimus lab result came back at 1.9 and is a good level. Patient to continue on the above dose. Reminded patient of his appointment with Dr. Cristobal Thornton on 4/24/23. Requested he have his labs drawn on 4/17/23 for the appointment. Patient verbally confirmed understanding.

## 2023-04-06 ENCOUNTER — HOSPITAL ENCOUNTER (OUTPATIENT)
Facility: HOSPITAL | Age: 73
Setting detail: SPECIMEN
Discharge: HOME | End: 2023-04-06
Payer: MEDICARE

## 2023-04-06 ENCOUNTER — OFFICE VISIT (OUTPATIENT)
Facility: CLINIC | Age: 73
End: 2023-04-06
Payer: MEDICARE

## 2023-04-06 VITALS
HEART RATE: 60 BPM | BODY MASS INDEX: 29.66 KG/M2 | OXYGEN SATURATION: 96 % | TEMPERATURE: 97.3 F | DIASTOLIC BLOOD PRESSURE: 61 MMHG | WEIGHT: 219 LBS | RESPIRATION RATE: 16 BRPM | HEIGHT: 72 IN | SYSTOLIC BLOOD PRESSURE: 132 MMHG

## 2023-04-06 DIAGNOSIS — I10 ESSENTIAL (PRIMARY) HYPERTENSION: ICD-10-CM

## 2023-04-06 DIAGNOSIS — N18.2 CHRONIC RENAL DISEASE, STAGE II: ICD-10-CM

## 2023-04-06 DIAGNOSIS — E11.22 TYPE 2 DIABETES MELLITUS WITH STAGE 2 CHRONIC KIDNEY DISEASE, WITHOUT LONG-TERM CURRENT USE OF INSULIN (HCC): ICD-10-CM

## 2023-04-06 DIAGNOSIS — E11.9 NEW ONSET TYPE 2 DIABETES MELLITUS (HCC): ICD-10-CM

## 2023-04-06 DIAGNOSIS — K70.30 ALCOHOLIC CIRRHOSIS OF LIVER WITHOUT ASCITES (HCC): ICD-10-CM

## 2023-04-06 DIAGNOSIS — E11.9 NEW ONSET TYPE 2 DIABETES MELLITUS (HCC): Primary | ICD-10-CM

## 2023-04-06 DIAGNOSIS — N18.2 TYPE 2 DIABETES MELLITUS WITH STAGE 2 CHRONIC KIDNEY DISEASE, WITHOUT LONG-TERM CURRENT USE OF INSULIN (HCC): ICD-10-CM

## 2023-04-06 LAB
CHOLEST SERPL-MCNC: 157 MG/DL
HBA1C MFR BLD: 5.8 %
HDLC SERPL-MCNC: 42 MG/DL (ref 40–60)
HDLC SERPL: 3.7 (ref 0–5)
LDLC SERPL CALC-MCNC: 79 MG/DL (ref 0–100)
LIPID PANEL: ABNORMAL
TRIGL SERPL-MCNC: 180 MG/DL
VLDLC SERPL CALC-MCNC: 36 MG/DL

## 2023-04-06 PROCEDURE — 99214 OFFICE O/P EST MOD 30 MIN: CPT | Performed by: FAMILY MEDICINE

## 2023-04-06 PROCEDURE — 36415 COLL VENOUS BLD VENIPUNCTURE: CPT

## 2023-04-06 PROCEDURE — 1036F TOBACCO NON-USER: CPT | Performed by: FAMILY MEDICINE

## 2023-04-06 PROCEDURE — G8417 CALC BMI ABV UP PARAM F/U: HCPCS | Performed by: FAMILY MEDICINE

## 2023-04-06 PROCEDURE — 1123F ACP DISCUSS/DSCN MKR DOCD: CPT | Performed by: FAMILY MEDICINE

## 2023-04-06 PROCEDURE — 3074F SYST BP LT 130 MM HG: CPT | Performed by: FAMILY MEDICINE

## 2023-04-06 PROCEDURE — 82043 UR ALBUMIN QUANTITATIVE: CPT

## 2023-04-06 PROCEDURE — 3044F HG A1C LEVEL LT 7.0%: CPT | Performed by: FAMILY MEDICINE

## 2023-04-06 PROCEDURE — G8427 DOCREV CUR MEDS BY ELIG CLIN: HCPCS | Performed by: FAMILY MEDICINE

## 2023-04-06 PROCEDURE — 3078F DIAST BP <80 MM HG: CPT | Performed by: FAMILY MEDICINE

## 2023-04-06 PROCEDURE — 83036 HEMOGLOBIN GLYCOSYLATED A1C: CPT | Performed by: FAMILY MEDICINE

## 2023-04-06 PROCEDURE — 2022F DILAT RTA XM EVC RTNOPTHY: CPT | Performed by: FAMILY MEDICINE

## 2023-04-06 PROCEDURE — 80061 LIPID PANEL: CPT

## 2023-04-06 PROCEDURE — 3017F COLORECTAL CA SCREEN DOC REV: CPT | Performed by: FAMILY MEDICINE

## 2023-04-06 SDOH — ECONOMIC STABILITY: INCOME INSECURITY: HOW HARD IS IT FOR YOU TO PAY FOR THE VERY BASICS LIKE FOOD, HOUSING, MEDICAL CARE, AND HEATING?: NOT HARD AT ALL

## 2023-04-06 SDOH — ECONOMIC STABILITY: FOOD INSECURITY: WITHIN THE PAST 12 MONTHS, YOU WORRIED THAT YOUR FOOD WOULD RUN OUT BEFORE YOU GOT MONEY TO BUY MORE.: NEVER TRUE

## 2023-04-06 SDOH — ECONOMIC STABILITY: FOOD INSECURITY: WITHIN THE PAST 12 MONTHS, THE FOOD YOU BOUGHT JUST DIDN'T LAST AND YOU DIDN'T HAVE MONEY TO GET MORE.: NEVER TRUE

## 2023-04-06 ASSESSMENT — PATIENT HEALTH QUESTIONNAIRE - PHQ9
SUM OF ALL RESPONSES TO PHQ QUESTIONS 1-9: 0
SUM OF ALL RESPONSES TO PHQ QUESTIONS 1-9: 0
1. LITTLE INTEREST OR PLEASURE IN DOING THINGS: 0
SUM OF ALL RESPONSES TO PHQ9 QUESTIONS 1 & 2: 0
2. FEELING DOWN, DEPRESSED OR HOPELESS: 0
SUM OF ALL RESPONSES TO PHQ QUESTIONS 1-9: 0
SUM OF ALL RESPONSES TO PHQ QUESTIONS 1-9: 0

## 2023-04-06 NOTE — PROGRESS NOTES
Fabian Arshad presents today for   Chief Complaint   Patient presents with    Hypertension     Abd A1C and glucose readings       Is someone accompanying this pt? Yes    Is the patient using any DME equipment during OV? Yes    Depression Screening:  No flowsheet data found. Learning Assessment:  No flowsheet data found. Abuse Screening:  No flowsheet data found. Fall Risk  No flowsheet data found. Health Maintenance reviewed and discussed and ordered per Provider. Health Maintenance Due   Topic Date Due    Hepatitis A vaccine (1 of 2 - Risk 2-dose series) Never done    Hepatitis B vaccine (1 of 3 - Risk 3-dose series) Never done    DTaP/Tdap/Td vaccine (1 - Tdap) Never done    AAA screen  Never done    COVID-19 Vaccine (3 - Moderna risk series) 09/29/2021   .        1. \"Have you been to the ER, urgent care clinic since your last visit? Hospitalized since your last visit? \" No    2. \"Have you seen or consulted any other health care providers outside of the [address] [place] [address] since your last visit? \" no    3. For patients over 45: Has the patient had a colonoscopy?  Yes - no Care Gap present
Yumi Hurt is a 74 y.o.  male and presents with    Chief Complaint   Patient presents with    Hypertension     Abd A1C and glucose readings           Subjective:  Diabetes Mellitus  Patient presents with new onset of Type 2 diabetes. Current symptoms include: hyperglycemia, polydipsia, and polyuria. Symptoms have gradually worsened. Patient denies paresthesia of the feet. Evaluation to date has included: fasting blood sugar. Home sugars: BGs range between 160 and 216 . Current treatment: none. Last dilated eye exam: yesterday. Hypertension  Patient is here for evaluation of hypertension. He indicates that he is feeling well and denies any symptoms referable to his elevated blood pressure. He has noted elevated blood pressure over the past several days. It is higher in the morning. Specifically denies chest pain, palpitations, dyspnea, orthopnea, PND or peripheral edema. Current medication regimen is as listed   below. Patient has these side effects of medication:  none . Cardiovascular risk factors: smoking/ tobacco exposure, dyslipidemia, male gender, hypertension Use of agents associated with hypertension: none History of renal disease: positive  History of flank trauma: negative     He has history of liver transplant with his brother as his donor.        ROS   General ROS: negative for - chills, fatigue, or fever; weight loss  Psychological ROS: negative for - anxiety or depression  Ophthalmic ROS: positive for - blurry vision and uses glasses  ENT ROS: negative for - headaches  Endocrine ROS: negative for - polydipsia/polyuria or temperature intolerance  Respiratory ROS: no cough, shortness of breath, or wheezing  Cardiovascular ROS: no chest pain or dyspnea on exertion  Gastrointestinal ROS: no abdominal pain, change in bowel habits, or black or bloody stools  Genito-Urinary ROS: no dysuria, trouble voiding, or hematuria  Musculoskeletal ROS: negative for - joint
No

## 2023-04-07 LAB
CREAT UR-MCNC: 175 MG/DL (ref 30–125)
MICROALBUMIN UR-MCNC: 1.23 MG/DL (ref 0–3)
MICROALBUMIN/CREAT UR-RTO: 7 MG/G (ref 0–30)

## 2023-04-17 DIAGNOSIS — Z79.60 LONG-TERM USE OF IMMUNOSUPPRESSANT MEDICATION: Primary | ICD-10-CM

## 2023-04-17 DIAGNOSIS — Z94.4 LIVER TRANSPLANT RECIPIENT (HCC): ICD-10-CM

## 2023-04-17 RX ORDER — MYCOPHENOLIC ACID 360 MG/1
360 TABLET, DELAYED RELEASE ORAL 2 TIMES DAILY
Qty: 180 TABLET | Refills: 3 | Status: SHIPPED | OUTPATIENT
Start: 2023-04-17 | End: 2023-07-16

## 2023-04-24 ENCOUNTER — OFFICE VISIT (OUTPATIENT)
Age: 73
End: 2023-04-24
Payer: MEDICARE

## 2023-04-24 VITALS
HEART RATE: 66 BPM | DIASTOLIC BLOOD PRESSURE: 77 MMHG | BODY MASS INDEX: 28.99 KG/M2 | TEMPERATURE: 97.7 F | HEIGHT: 72 IN | SYSTOLIC BLOOD PRESSURE: 172 MMHG | WEIGHT: 214 LBS | OXYGEN SATURATION: 97 %

## 2023-04-24 DIAGNOSIS — Z94.4 LIVER TRANSPLANT RECIPIENT (HCC): ICD-10-CM

## 2023-04-24 DIAGNOSIS — Z79.60 LONG-TERM USE OF IMMUNOSUPPRESSANT MEDICATION: ICD-10-CM

## 2023-04-24 PROCEDURE — G8417 CALC BMI ABV UP PARAM F/U: HCPCS | Performed by: INTERNAL MEDICINE

## 2023-04-24 PROCEDURE — 3017F COLORECTAL CA SCREEN DOC REV: CPT | Performed by: INTERNAL MEDICINE

## 2023-04-24 PROCEDURE — 3078F DIAST BP <80 MM HG: CPT | Performed by: INTERNAL MEDICINE

## 2023-04-24 PROCEDURE — 1036F TOBACCO NON-USER: CPT | Performed by: INTERNAL MEDICINE

## 2023-04-24 PROCEDURE — G8427 DOCREV CUR MEDS BY ELIG CLIN: HCPCS | Performed by: INTERNAL MEDICINE

## 2023-04-24 PROCEDURE — 99215 OFFICE O/P EST HI 40 MIN: CPT | Performed by: INTERNAL MEDICINE

## 2023-04-24 PROCEDURE — 3074F SYST BP LT 130 MM HG: CPT | Performed by: INTERNAL MEDICINE

## 2023-04-24 PROCEDURE — 1123F ACP DISCUSS/DSCN MKR DOCD: CPT | Performed by: INTERNAL MEDICINE

## 2023-04-24 ASSESSMENT — PATIENT HEALTH QUESTIONNAIRE - PHQ9
SUM OF ALL RESPONSES TO PHQ QUESTIONS 1-9: 0
2. FEELING DOWN, DEPRESSED OR HOPELESS: 0
SUM OF ALL RESPONSES TO PHQ QUESTIONS 1-9: 0
1. LITTLE INTEREST OR PLEASURE IN DOING THINGS: 0
SUM OF ALL RESPONSES TO PHQ QUESTIONS 1-9: 0
SUM OF ALL RESPONSES TO PHQ QUESTIONS 1-9: 0
SUM OF ALL RESPONSES TO PHQ9 QUESTIONS 1 & 2: 0

## 2023-04-25 ENCOUNTER — TELEPHONE (OUTPATIENT)
Age: 73
End: 2023-04-25

## 2023-04-25 NOTE — TELEPHONE ENCOUNTER
Patient called to review his new tacrolimus dosing Dr. Jennifer Wagner prescribed yesterday. Informed patient he is to take Tacrolimus 2mg in AM and 1 mg in PM.   Reminded patient his follow up appointment is with Tamera Rodríguez NP on 7/18/23 @ 2pm. Requested patient have his labs drawn on 7/12/23. Patient verbally confirmed all above. Patient requested his wife be referred to the same Cardiologist I referred him. Informed patient since his wife is not a patient of ours, I can not refer her. Recommended patient contact her PCP and ask them to send a referral to the new Cardiologist with her recent medical records. Patient verbally confirmed understanding.

## 2023-05-16 RX ORDER — TACROLIMUS 1 MG/1
3 CAPSULE ORAL DAILY
Qty: 360 CAPSULE | Refills: 3
Start: 2023-05-16 | End: 2023-08-14

## 2023-05-17 ENCOUNTER — TELEPHONE (OUTPATIENT)
Facility: CLINIC | Age: 73
End: 2023-05-17

## 2023-05-17 ENCOUNTER — TELEPHONE (OUTPATIENT)
Age: 73
End: 2023-05-17

## 2023-05-17 DIAGNOSIS — I10 ESSENTIAL (PRIMARY) HYPERTENSION: Primary | ICD-10-CM

## 2023-05-17 NOTE — TELEPHONE ENCOUNTER
Patient called requesting a refill on mycophenalated. Called pharmacy to see if he had one left. Patient did have medication refills left on script in the pharmacy. Patient will  tomorrow.

## 2023-05-24 ENCOUNTER — TELEPHONE (OUTPATIENT)
Age: 73
End: 2023-05-24

## 2023-05-24 NOTE — TELEPHONE ENCOUNTER
Called patient to review recent lab results from 5/22/23. Informed patient his labs are all within normal limits. Recommended patient have his next labs drawn on 7/11/23, prior to his appointment on 7/18/23 with Mari Coker NP. Patient verbally confirmed understanding.

## 2023-06-05 ENCOUNTER — OFFICE VISIT (OUTPATIENT)
Dept: CARDIOTHORACIC SURGERY | Age: 73
End: 2023-06-05
Payer: MEDICARE

## 2023-06-05 VITALS
BODY MASS INDEX: 29.26 KG/M2 | OXYGEN SATURATION: 96 % | SYSTOLIC BLOOD PRESSURE: 136 MMHG | DIASTOLIC BLOOD PRESSURE: 72 MMHG | HEIGHT: 72 IN | HEART RATE: 68 BPM | WEIGHT: 216 LBS | TEMPERATURE: 97.9 F

## 2023-06-05 DIAGNOSIS — I71.21 ANEURYSM OF ASCENDING AORTA WITHOUT RUPTURE (HCC): Primary | ICD-10-CM

## 2023-06-05 PROCEDURE — G8417 CALC BMI ABV UP PARAM F/U: HCPCS | Performed by: THORACIC SURGERY (CARDIOTHORACIC VASCULAR SURGERY)

## 2023-06-05 PROCEDURE — 99204 OFFICE O/P NEW MOD 45 MIN: CPT | Performed by: THORACIC SURGERY (CARDIOTHORACIC VASCULAR SURGERY)

## 2023-06-05 PROCEDURE — 3074F SYST BP LT 130 MM HG: CPT | Performed by: THORACIC SURGERY (CARDIOTHORACIC VASCULAR SURGERY)

## 2023-06-05 PROCEDURE — G8427 DOCREV CUR MEDS BY ELIG CLIN: HCPCS | Performed by: THORACIC SURGERY (CARDIOTHORACIC VASCULAR SURGERY)

## 2023-06-05 PROCEDURE — 3078F DIAST BP <80 MM HG: CPT | Performed by: THORACIC SURGERY (CARDIOTHORACIC VASCULAR SURGERY)

## 2023-06-05 NOTE — PROGRESS NOTES
physician for his office visits. This might help regulating his blood pressure medications if his blood pressure is above the recommended range. I also instructed him to avoid heavy lifting, straining, and severe Valsalva maneuvers to decrease sudden spike in blood pressure episodes. Patient understands and he is agreeable with the plan. I will get in touch with him after I see CT scan results in July. Thank very much for the opportunity to be involved in Mr. Manzano's care.

## 2023-07-10 ENCOUNTER — CLINICAL DOCUMENTATION (OUTPATIENT)
Age: 73
End: 2023-07-10

## 2023-07-10 NOTE — PROGRESS NOTES
Patient scheduled for Aorta to screen for AAA and MISAEL per [place], [place] from Jan. In March Dr. Noemi Griffith saw the patient and  ordered a CTA abdomen/pelvis to evualate for AAA for Dec. I spoke to Sabra Simon and she said cancel Aorta ultrasound and keep patient on for MISAEL tomorrow and CTA in DecJuliene Mukesh to call patient to explain.

## 2023-07-11 ENCOUNTER — HOSPITAL ENCOUNTER (OUTPATIENT)
Facility: HOSPITAL | Age: 73
Discharge: HOME | End: 2023-07-14

## 2023-07-11 DIAGNOSIS — I71.21 ANEURYSM OF ASCENDING AORTA WITHOUT RUPTURE (HCC): ICD-10-CM

## 2023-07-11 DIAGNOSIS — I73.9 PERIPHERAL VASCULAR DISEASE, UNSPECIFIED (HCC): Primary | ICD-10-CM

## 2023-07-11 DIAGNOSIS — I73.9 PAD (PERIPHERAL ARTERY DISEASE) (HCC): ICD-10-CM

## 2023-07-18 ENCOUNTER — OFFICE VISIT (OUTPATIENT)
Age: 73
End: 2023-07-18
Payer: MEDICARE

## 2023-07-18 VITALS
OXYGEN SATURATION: 98 % | TEMPERATURE: 96.9 F | WEIGHT: 217 LBS | BODY MASS INDEX: 29.39 KG/M2 | HEIGHT: 72 IN | SYSTOLIC BLOOD PRESSURE: 150 MMHG | DIASTOLIC BLOOD PRESSURE: 72 MMHG | HEART RATE: 78 BPM

## 2023-07-18 DIAGNOSIS — Z94.4 LIVER TRANSPLANT RECIPIENT (HCC): Primary | ICD-10-CM

## 2023-07-18 PROCEDURE — 3017F COLORECTAL CA SCREEN DOC REV: CPT | Performed by: NURSE PRACTITIONER

## 2023-07-18 PROCEDURE — G8417 CALC BMI ABV UP PARAM F/U: HCPCS | Performed by: NURSE PRACTITIONER

## 2023-07-18 PROCEDURE — 3078F DIAST BP <80 MM HG: CPT | Performed by: NURSE PRACTITIONER

## 2023-07-18 PROCEDURE — 3077F SYST BP >= 140 MM HG: CPT | Performed by: NURSE PRACTITIONER

## 2023-07-18 PROCEDURE — G8428 CUR MEDS NOT DOCUMENT: HCPCS | Performed by: NURSE PRACTITIONER

## 2023-07-18 PROCEDURE — 1036F TOBACCO NON-USER: CPT | Performed by: NURSE PRACTITIONER

## 2023-07-18 PROCEDURE — 99215 OFFICE O/P EST HI 40 MIN: CPT | Performed by: NURSE PRACTITIONER

## 2023-07-18 PROCEDURE — 1123F ACP DISCUSS/DSCN MKR DOCD: CPT | Performed by: NURSE PRACTITIONER

## 2023-07-18 RX ORDER — AMLODIPINE BESYLATE 5 MG/1
5 TABLET ORAL DAILY
COMMUNITY
Start: 2023-05-18

## 2023-07-18 ASSESSMENT — PATIENT HEALTH QUESTIONNAIRE - PHQ9
SUM OF ALL RESPONSES TO PHQ QUESTIONS 1-9: 0
SUM OF ALL RESPONSES TO PHQ9 QUESTIONS 1 & 2: 0
SUM OF ALL RESPONSES TO PHQ QUESTIONS 1-9: 0
2. FEELING DOWN, DEPRESSED OR HOPELESS: 0
SUM OF ALL RESPONSES TO PHQ QUESTIONS 1-9: 0
SUM OF ALL RESPONSES TO PHQ QUESTIONS 1-9: 0
1. LITTLE INTEREST OR PLEASURE IN DOING THINGS: 0

## 2023-07-18 NOTE — PROGRESS NOTES
NSAIDs should be avoided since these agents can worsen renal insufficiency. Hypercholesterolemia   This can be caused by immune suppression. Serum cholesterol is normal and does not need to be treated at this time. Hypertension   This is a common side effect of immune suppression. Blood pressure is well controlled on the current treatment. The patient is currently on a BB (Metoprolol). He c/o ED. Maybe he can be switched to a different class of ant-hypertensive medication. Low serum magnesium   This is a common side effect of immune suppression. The patient has a normal or near normal magnesium level and does not need supplementation. Osteoporosis   Osteoporosis is common in patients with cirhrosis prior to liver transplant. Will get DEXA scan to evalaute bone desnity and determine if any treatment is needed for osteoporosis. Monitoring for skin Cancer  The patient was counseled regarding increased risk of skin cancer in transplant recipients and need to have any new skin lesions evaluated by dermatology and removed if suspicious. The patient was instructed to see Dermatology annually examination. Vaccinations  Routine vaccinations against other bacterial and viral agents can be performed as long as this is with attentuatted virus. Live virus vaccines should not be administered. Annual flu vaccination should be administered. The patient has received 2 doses of COVID-19 vaccine.       ALLERGIES  No Known Allergies    MEDICATIONS  Current Outpatient Medications   Medication Instructions    calcium citrate (CALCITRATE) 950 (200 Ca) MG tablet 1 tablet, Oral, DAILY    dapagliflozin (FARXIGA) 10 mg, Oral, EVERY MORNING    dapsone 100 mg, Oral, DAILY    diclofenac sodium (VOLTAREN) 1 % GEL 2 g applied TOPICALLY to left shoulder 4 times daily; 16 g/day to any single joint of lower extremity,    docusate (COLACE, DULCOLAX) 100 MG CAPS 1 capsule, Oral    losartan (COZAAR) 50 mg, Oral,

## 2023-07-24 ENCOUNTER — HOSPITAL ENCOUNTER (OUTPATIENT)
Facility: HOSPITAL | Age: 73
Setting detail: SPECIMEN
Discharge: HOME | End: 2023-07-27
Payer: MEDICARE

## 2023-07-24 ENCOUNTER — OFFICE VISIT (OUTPATIENT)
Facility: CLINIC | Age: 73
End: 2023-07-24
Payer: MEDICARE

## 2023-07-24 VITALS
SYSTOLIC BLOOD PRESSURE: 128 MMHG | TEMPERATURE: 97.6 F | HEIGHT: 72 IN | DIASTOLIC BLOOD PRESSURE: 68 MMHG | OXYGEN SATURATION: 95 % | WEIGHT: 221 LBS | RESPIRATION RATE: 12 BRPM | HEART RATE: 61 BPM | BODY MASS INDEX: 29.93 KG/M2

## 2023-07-24 DIAGNOSIS — N52.2 DRUG-INDUCED ERECTILE DYSFUNCTION: ICD-10-CM

## 2023-07-24 DIAGNOSIS — R68.82 LOW LIBIDO: ICD-10-CM

## 2023-07-24 DIAGNOSIS — I10 ESSENTIAL (PRIMARY) HYPERTENSION: ICD-10-CM

## 2023-07-24 DIAGNOSIS — K21.9 GASTRO-ESOPHAGEAL REFLUX DISEASE WITHOUT ESOPHAGITIS: ICD-10-CM

## 2023-07-24 DIAGNOSIS — Z12.11 COLON CANCER SCREENING: ICD-10-CM

## 2023-07-24 DIAGNOSIS — N52.01 ERECTILE DYSFUNCTION DUE TO ARTERIAL INSUFFICIENCY: ICD-10-CM

## 2023-07-24 DIAGNOSIS — N18.2 TYPE 2 DIABETES MELLITUS WITH STAGE 2 CHRONIC KIDNEY DISEASE, WITHOUT LONG-TERM CURRENT USE OF INSULIN (HCC): Primary | ICD-10-CM

## 2023-07-24 DIAGNOSIS — E11.22 TYPE 2 DIABETES MELLITUS WITH STAGE 2 CHRONIC KIDNEY DISEASE, WITHOUT LONG-TERM CURRENT USE OF INSULIN (HCC): Primary | ICD-10-CM

## 2023-07-24 PROCEDURE — 1123F ACP DISCUSS/DSCN MKR DOCD: CPT | Performed by: FAMILY MEDICINE

## 2023-07-24 PROCEDURE — 2022F DILAT RTA XM EVC RTNOPTHY: CPT | Performed by: FAMILY MEDICINE

## 2023-07-24 PROCEDURE — G8417 CALC BMI ABV UP PARAM F/U: HCPCS | Performed by: FAMILY MEDICINE

## 2023-07-24 PROCEDURE — 36415 COLL VENOUS BLD VENIPUNCTURE: CPT

## 2023-07-24 PROCEDURE — 3074F SYST BP LT 130 MM HG: CPT | Performed by: FAMILY MEDICINE

## 2023-07-24 PROCEDURE — 99214 OFFICE O/P EST MOD 30 MIN: CPT | Performed by: FAMILY MEDICINE

## 2023-07-24 PROCEDURE — 1036F TOBACCO NON-USER: CPT | Performed by: FAMILY MEDICINE

## 2023-07-24 PROCEDURE — 3044F HG A1C LEVEL LT 7.0%: CPT | Performed by: FAMILY MEDICINE

## 2023-07-24 PROCEDURE — 3078F DIAST BP <80 MM HG: CPT | Performed by: FAMILY MEDICINE

## 2023-07-24 PROCEDURE — 3017F COLORECTAL CA SCREEN DOC REV: CPT | Performed by: FAMILY MEDICINE

## 2023-07-24 PROCEDURE — G8427 DOCREV CUR MEDS BY ELIG CLIN: HCPCS | Performed by: FAMILY MEDICINE

## 2023-07-24 PROCEDURE — 84402 ASSAY OF FREE TESTOSTERONE: CPT

## 2023-07-24 RX ORDER — LOSARTAN POTASSIUM 50 MG/1
50 TABLET ORAL 2 TIMES DAILY
Qty: 180 TABLET | Refills: 3 | Status: SHIPPED | OUTPATIENT
Start: 2023-07-24 | End: 2023-07-28 | Stop reason: SDUPTHER

## 2023-07-24 RX ORDER — TADALAFIL 20 MG/1
20 TABLET ORAL DAILY PRN
Qty: 30 TABLET | Refills: 1 | Status: SHIPPED | OUTPATIENT
Start: 2023-07-24 | End: 2023-07-28 | Stop reason: SDUPTHER

## 2023-07-24 SDOH — ECONOMIC STABILITY: INCOME INSECURITY: HOW HARD IS IT FOR YOU TO PAY FOR THE VERY BASICS LIKE FOOD, HOUSING, MEDICAL CARE, AND HEATING?: NOT HARD AT ALL

## 2023-07-24 SDOH — ECONOMIC STABILITY: FOOD INSECURITY: WITHIN THE PAST 12 MONTHS, YOU WORRIED THAT YOUR FOOD WOULD RUN OUT BEFORE YOU GOT MONEY TO BUY MORE.: NEVER TRUE

## 2023-07-24 SDOH — ECONOMIC STABILITY: FOOD INSECURITY: WITHIN THE PAST 12 MONTHS, THE FOOD YOU BOUGHT JUST DIDN'T LAST AND YOU DIDN'T HAVE MONEY TO GET MORE.: NEVER TRUE

## 2023-07-24 ASSESSMENT — PATIENT HEALTH QUESTIONNAIRE - PHQ9
SUM OF ALL RESPONSES TO PHQ QUESTIONS 1-9: 0
1. LITTLE INTEREST OR PLEASURE IN DOING THINGS: 0
SUM OF ALL RESPONSES TO PHQ QUESTIONS 1-9: 0
SUM OF ALL RESPONSES TO PHQ QUESTIONS 1-9: 0
SUM OF ALL RESPONSES TO PHQ9 QUESTIONS 1 & 2: 0
2. FEELING DOWN, DEPRESSED OR HOPELESS: 0
SUM OF ALL RESPONSES TO PHQ QUESTIONS 1-9: 0

## 2023-07-24 NOTE — PROGRESS NOTES
Apolonia Hook is a 74 y.o. presents today for   Chief Complaint   Patient presents with    Diabetes    Other     Pt reports metoprolol 25mg 2 times daily is interfering with sex life. Want to see if medication can be changed so he can enjoy his wife    Hypertension       Is someone accompanying this pt? Reynold aSm    Is the patient using any DME equipment during OV? NO    Depression Screening:   PHQ-9 Questionaire 7/24/2023 7/18/2023 4/24/2023 4/6/2023 2/21/2023 1/23/2023 10/25/2022   Little interest or pleasure in doing things 0 0 0 0 0 0 0   Feeling down, depressed, or hopeless 0 0 0 0 0 0 0   PHQ-9 Total Score 0 0 0 0 0 0 0       Abuse Screening: AMB Abuse Screening 3/21/2023 2/21/2023   Do you ever feel afraid of your partner? N N   Are you in a relationship with someone who physically or mentally threatens you? N N   Is it safe for you to go home? Y Y       Learning Assessment:  No question data found. Fall Risk:  Fall Risk 7/24/2023 4/6/2023 3/21/2023 2/21/2023   2 or more falls in past year? no no no no   Fall with injury in past year? no no no no           Coordination of Care:   1. \"Have you been to the ER, urgent care clinic since your last visit? Hospitalized since your last visit? \" YES LIVER SPECIALIST & VASCULAR SPECIALIST    2. \"Have you seen or consulted any other health care providers outside of the [address] [place] [address] since your last visit? \" YES LIVER SPECIALIST & VASCULAR SPECIALIST    3. For patients aged 43-73: Has the patient had a colonoscopy / FIT/ Cologuard? DUE    If the patient is female:    4. For patients aged 43-66: Has the patient had a mammogram within the past 2 years? N/A    5. For patients aged 21-65: Has the patient had a pap smear? N/A    Health Maintenance: reviewed and discussed and ordered per Provider.     Health Maintenance Due   Topic Date Due    Hepatitis A vaccine (1 of 2 - Risk 2-dose series) Never done    Diabetic foot exam  Never done    Diabetic retinal exam

## 2023-07-27 ENCOUNTER — TELEPHONE (OUTPATIENT)
Facility: CLINIC | Age: 73
End: 2023-07-27

## 2023-07-27 NOTE — TELEPHONE ENCOUNTER
Patient states his pharmacy never received the scripts for Losartan or Tadalafil; which he requested at [address] [place] [address] Dr. Prudencio Salcido. .. PLEASE FORWARD TO PCP WITH MEDICATION ATTACHED TO MESSAGE. This patient contacted the office for the following prescription to be refilled:    Medication requested:     Drug Name:  Omeprazole  Dosage -  40 mg    Please send all medication refills to:     Pharmacy:   [facility]/pharm[facility] - #[id number] [address] [place] [address], [address] [place] [address]   Phone:  [phone]    Fax:  [phone]    PCP: Reynaldo Easton MD  LOV:   NOV DMA: Visit date not found  FUTURE APPT:   Future Appointments   Date Time Provider Department   10/24/2023  2:30 PM LIZANDRO Ta - CNP [facility]   12/21/2023  2:00 PM Joshua Alas MD [facility]     Thank you.

## 2023-07-28 RX ORDER — LOSARTAN POTASSIUM 50 MG/1
50 TABLET ORAL 2 TIMES DAILY
Qty: 180 TABLET | Refills: 3 | Status: SHIPPED | OUTPATIENT
Start: 2023-07-28

## 2023-07-28 RX ORDER — OMEPRAZOLE 40 MG/1
40 CAPSULE, DELAYED RELEASE ORAL DAILY
Qty: 90 CAPSULE | Refills: 3 | Status: SHIPPED | OUTPATIENT
Start: 2023-07-28

## 2023-07-28 RX ORDER — TADALAFIL 20 MG/1
20 TABLET ORAL DAILY PRN
Qty: 30 TABLET | Refills: 1 | Status: SHIPPED | OUTPATIENT
Start: 2023-07-28

## 2023-07-28 RX ORDER — OMEPRAZOLE 40 MG/1
40 CAPSULE, DELAYED RELEASE ORAL DAILY
Qty: 30 CAPSULE | Status: CANCELLED | OUTPATIENT
Start: 2023-07-28

## 2023-07-30 LAB — TESTOST FREE SERPL-MCNC: 5.9 PG/ML (ref 6.6–18.1)

## 2023-08-01 ENCOUNTER — TELEPHONE (OUTPATIENT)
Facility: CLINIC | Age: 73
End: 2023-08-01

## 2023-08-01 NOTE — TELEPHONE ENCOUNTER
Patient states the Rx for Cialis requires pre-authorization. Also, patient is requesting a call to advise once the authorization has been approved or denied. Please assist. Thank you.

## 2023-08-04 DIAGNOSIS — N52.01 ERECTILE DYSFUNCTION DUE TO ARTERIAL INSUFFICIENCY: ICD-10-CM

## 2023-08-04 RX ORDER — TADALAFIL 20 MG/1
20 TABLET ORAL DAILY PRN
Qty: 30 TABLET | Refills: 1 | Status: SHIPPED | OUTPATIENT
Start: 2023-08-04

## 2023-08-04 NOTE — TELEPHONE ENCOUNTER
[address] was advised medication is not covered by insurance, however, can be purchased out of pocket w/Rx card for $23.10. T/C Pt to advise Cialis not covered by insurance, may be purchased with Rx. Card for discount price. Left message for Pt to return call to office for Rx details.

## 2023-08-04 NOTE — TELEPHONE ENCOUNTER
Pt came in the office stating that cialis was sent to the wrong pharmacy. I resent it to [facility] on [facility]. Per last note, Bre initiated Prior Authorization for Cialis via Cover My Meds. Will check for insurance response.

## 2023-08-09 DIAGNOSIS — E11.9 NEW ONSET TYPE 2 DIABETES MELLITUS (HCC): ICD-10-CM

## 2023-08-09 DIAGNOSIS — E29.1 HYPOGONADISM IN MALE: Primary | ICD-10-CM

## 2023-08-09 DIAGNOSIS — Z79.60 LONG-TERM USE OF IMMUNOSUPPRESSANT MEDICATION: Primary | ICD-10-CM

## 2023-08-09 DIAGNOSIS — N18.2 CHRONIC RENAL DISEASE, STAGE II: ICD-10-CM

## 2023-08-09 DIAGNOSIS — Z94.4 LIVER TRANSPLANT RECIPIENT (HCC): ICD-10-CM

## 2023-08-09 RX ORDER — TESTOSTERONE 10 MG/.5G
1 GEL, METERED TOPICAL DAILY
Qty: 60 G | Refills: 0 | Status: SHIPPED | OUTPATIENT
Start: 2023-08-09 | End: 2023-09-08

## 2023-08-09 RX ORDER — DAPAGLIFLOZIN 10 MG/1
TABLET, FILM COATED ORAL
Qty: 90 TABLET | Refills: 1 | Status: SHIPPED | OUTPATIENT
Start: 2023-08-09

## 2023-08-09 RX ORDER — MYCOPHENOLIC ACID 180 MG/1
360 TABLET, DELAYED RELEASE ORAL 2 TIMES DAILY
Qty: 360 TABLET | Refills: 3 | Status: SHIPPED | OUTPATIENT
Start: 2023-08-09 | End: 2024-08-03

## 2023-09-08 DIAGNOSIS — Z79.60 LONG-TERM USE OF IMMUNOSUPPRESSANT MEDICATION: ICD-10-CM

## 2023-09-08 DIAGNOSIS — Z94.4 LIVER TRANSPLANT RECIPIENT (HCC): ICD-10-CM

## 2023-09-08 RX ORDER — MYCOPHENOLIC ACID 360 MG/1
360 TABLET, DELAYED RELEASE ORAL 2 TIMES DAILY
Qty: 120 TABLET | Refills: 3 | Status: SHIPPED | OUTPATIENT
Start: 2023-09-08 | End: 2024-09-02

## 2023-10-04 ENCOUNTER — OFFICE VISIT (OUTPATIENT)
Facility: CLINIC | Age: 73
End: 2023-10-04
Payer: MEDICARE

## 2023-10-04 ENCOUNTER — TELEPHONE (OUTPATIENT)
Facility: CLINIC | Age: 73
End: 2023-10-04

## 2023-10-04 VITALS
DIASTOLIC BLOOD PRESSURE: 73 MMHG | HEART RATE: 65 BPM | TEMPERATURE: 97.6 F | RESPIRATION RATE: 20 BRPM | SYSTOLIC BLOOD PRESSURE: 145 MMHG | WEIGHT: 217.6 LBS | BODY MASS INDEX: 29.47 KG/M2 | OXYGEN SATURATION: 97 % | HEIGHT: 72 IN

## 2023-10-04 DIAGNOSIS — I10 ESSENTIAL (PRIMARY) HYPERTENSION: ICD-10-CM

## 2023-10-04 DIAGNOSIS — N18.2 TYPE 2 DIABETES MELLITUS WITH STAGE 2 CHRONIC KIDNEY DISEASE, WITHOUT LONG-TERM CURRENT USE OF INSULIN (HCC): ICD-10-CM

## 2023-10-04 DIAGNOSIS — N52.01 ERECTILE DYSFUNCTION DUE TO ARTERIAL INSUFFICIENCY: ICD-10-CM

## 2023-10-04 DIAGNOSIS — E11.22 TYPE 2 DIABETES MELLITUS WITH STAGE 2 CHRONIC KIDNEY DISEASE, WITHOUT LONG-TERM CURRENT USE OF INSULIN (HCC): ICD-10-CM

## 2023-10-04 DIAGNOSIS — D69.6 THROMBOCYTOPENIA, UNSPECIFIED (HCC): Primary | ICD-10-CM

## 2023-10-04 PROCEDURE — 3017F COLORECTAL CA SCREEN DOC REV: CPT | Performed by: FAMILY MEDICINE

## 2023-10-04 PROCEDURE — 99214 OFFICE O/P EST MOD 30 MIN: CPT | Performed by: FAMILY MEDICINE

## 2023-10-04 PROCEDURE — 3074F SYST BP LT 130 MM HG: CPT | Performed by: FAMILY MEDICINE

## 2023-10-04 PROCEDURE — G8484 FLU IMMUNIZE NO ADMIN: HCPCS | Performed by: FAMILY MEDICINE

## 2023-10-04 PROCEDURE — 3044F HG A1C LEVEL LT 7.0%: CPT | Performed by: FAMILY MEDICINE

## 2023-10-04 PROCEDURE — 3078F DIAST BP <80 MM HG: CPT | Performed by: FAMILY MEDICINE

## 2023-10-04 PROCEDURE — 1123F ACP DISCUSS/DSCN MKR DOCD: CPT | Performed by: FAMILY MEDICINE

## 2023-10-04 PROCEDURE — G8427 DOCREV CUR MEDS BY ELIG CLIN: HCPCS | Performed by: FAMILY MEDICINE

## 2023-10-04 PROCEDURE — 1036F TOBACCO NON-USER: CPT | Performed by: FAMILY MEDICINE

## 2023-10-04 PROCEDURE — G8417 CALC BMI ABV UP PARAM F/U: HCPCS | Performed by: FAMILY MEDICINE

## 2023-10-04 PROCEDURE — 2022F DILAT RTA XM EVC RTNOPTHY: CPT | Performed by: FAMILY MEDICINE

## 2023-10-04 RX ORDER — AMLODIPINE BESYLATE 5 MG/1
5 TABLET ORAL DAILY
Qty: 90 TABLET | Refills: 3 | Status: SHIPPED | OUTPATIENT
Start: 2023-10-04

## 2023-10-04 NOTE — TELEPHONE ENCOUNTER
Patient called to advise that the medication that was prescribed today by provider is too expensive. He would like to go back to his previous one that he can afford. Stated that he would like for it to be sent to the [facility] pharmacy.        Please advise    Thank you

## 2023-10-04 NOTE — PROGRESS NOTES
Talia Bhatia is a 74 y.o. presents today for   Chief Complaint   Patient presents with    Medication Refill       Is someone accompanying this pt? NO    Is the patient using any DME equipment during OV? NO    Depression Screenin/24/2023     9:33 AM 2023     2:24 PM 2023     3:40 PM 2023    11:43 AM 2023     8:43 AM 2023    12:32 PM 10/25/2022     3:02 PM   PHQ-9 Questionaire   Little interest or pleasure in doing things 0 0 0 0 0 0 0   Feeling down, depressed, or hopeless 0 0 0 0 0 0 0   PHQ-9 Total Score 0 0 0 0 0 0 0       Abuse Screening:      3/21/2023     3:00 PM 2023     8:00 AM   AMB Abuse Screening   Do you ever feel afraid of your partner? N N   Are you in a relationship with someone who physically or mentally threatens you? N N   Is it safe for you to go home? Y Y       Learning Assessment:  No question data found. Fall Risk:      2023     9:34 AM 2023    11:43 AM 3/21/2023     3:06 PM 2023     8:43 AM   Fall Risk   2 or more falls in past year? no no no no   Fall with injury in past year? no no no no           Coordination of Care:   1. \"Have you been to the ER, urgent care clinic since your last visit? Hospitalized since your last visit? \" NO    2. \"Have you seen or consulted any other health care providers outside of the [address] [place] [address] since your last visit? \" CARDIOLOGY/LIVER SPECIALIST    3. For patients aged 43-73: Has the patient had a colonoscopy / FIT/ Cologuard? YES    If the patient is female:    4. For patients aged 43-66: Has the patient had a mammogram within the past 2 years? NO    5. For patients aged 21-65: Has the patient had a pap smear? NO     Health Maintenance: reviewed and discussed and ordered per Provider.     Health Maintenance Due   Topic Date Due    Diabetic retinal exam  Never done    Hepatitis A vaccine (1 of 2 - Risk 2-dose series) Never done    DTaP/Tdap/Td vaccine (1 - Tdap) Never done    Hepatitis B
processes  Chest - clear to auscultation, no wheezes, rales or rhonchi, symmetric air entry  Heart - normal rate, regular rhythm, normal S1, S2, no murmurs, rubs, clicks or gallops  Extremities - peripheral pulses normal, no pedal edema, no clubbing or cyanosis    LABS     TESTS      Assessment/Plan:    1. Thrombocytopenia, unspecified ([address])  Continue to monitor; no bleeding    2. Type 2 diabetes mellitus with stage 2 chronic kidney disease, without long-term current use of insulin ([facility])  Goalhgb a1c <7; difficulty getting medication approved; encourage low carb diet    3. Essential (primary) hypertension  Goal <130/80; start CCB  - amLODIPine (NORVASC) 5 MG tablet; Take 1 tablet by mouth daily  Dispense: 90 tablet; Refill: 3    4. Erectile dysfunction due to arterial insufficiency  Refer for further evaluation and treatment  - External Referral To Urology       Lab review: labs reviewed, I note that glycosylated hemoglobin mildly abnormal but acceptable      I have discussed the diagnosis with the patient and the intended plan as seen in the above orders. The patient has received an after-visit summary and questions were answered concerning future plans. I have discussed medication side effects and warnings with the patient as well. I have reviewed the plan of care with the patient, accepted their input and they are in agreement with the treatment goals.

## 2023-10-05 ENCOUNTER — TELEPHONE (OUTPATIENT)
Facility: CLINIC | Age: 73
End: 2023-10-05

## 2023-10-05 NOTE — TELEPHONE ENCOUNTER
Patient called regarding the Rx for Jardiance. He states the cost is more than the Yelitza, so he would like to know if he can take Yelitza instead? Also, there is a Rx for Yelitza at his pharmacy. Please advise as soon as possible today, because the pharmacy will only hold it for him until today. Please advise. Thank you.

## 2023-10-11 ENCOUNTER — TELEPHONE (OUTPATIENT)
Facility: CLINIC | Age: 73
End: 2023-10-11

## 2023-10-11 DIAGNOSIS — N18.2 CHRONIC RENAL DISEASE, STAGE II: ICD-10-CM

## 2023-10-11 DIAGNOSIS — E11.9 NEW ONSET TYPE 2 DIABETES MELLITUS (HCC): Primary | ICD-10-CM

## 2023-10-11 NOTE — TELEPHONE ENCOUNTER
Patient requested med refill back on 10/5/23. Can you please assist?  Thank  you. Patient called regarding the Rx for Jardiance. He states the cost is more than the Yelitza, so he would like to know if he can take Yelitza instead? Also, there is a Rx for Yelitza at his pharmacy. Please advise as soon as possible today, because the pharmacy will only hold it for him until today. Please advise. Thank you.

## 2023-10-12 ENCOUNTER — TELEPHONE (OUTPATIENT)
Facility: CLINIC | Age: 73
End: 2023-10-12

## 2023-10-12 NOTE — TELEPHONE ENCOUNTER
Patient called back to inform Dr. Paddy Paulino he called his insurance company and is all set to receive Yelitza for this month. He was advised to reach out to Social Security to find out the maximum allowed for medications and they will assist him with future medications. Thank you.

## 2023-10-12 NOTE — TELEPHONE ENCOUNTER
Patient called stating the Rx for Veena Mckinnon will cost him $152.00, which he cannot afford. Patient is going to reach out to his insurance company to see what alternative medication he can take that is covered and will call back to advise. Thank you.

## 2023-10-19 ENCOUNTER — TELEPHONE (OUTPATIENT)
Age: 73
End: 2023-10-19

## 2023-10-19 NOTE — TELEPHONE ENCOUNTER
Critical Tacrolimus level 1.2 , reported by [facility] lab , provider notified verbally also gave written document .

## 2023-10-24 ENCOUNTER — OFFICE VISIT (OUTPATIENT)
Age: 73
End: 2023-10-24
Payer: MEDICARE

## 2023-10-24 DIAGNOSIS — Z94.4 LIVER TRANSPLANT RECIPIENT (HCC): Primary | ICD-10-CM

## 2023-10-24 PROCEDURE — G8417 CALC BMI ABV UP PARAM F/U: HCPCS | Performed by: NURSE PRACTITIONER

## 2023-10-24 PROCEDURE — 3017F COLORECTAL CA SCREEN DOC REV: CPT | Performed by: NURSE PRACTITIONER

## 2023-10-24 PROCEDURE — G8428 CUR MEDS NOT DOCUMENT: HCPCS | Performed by: NURSE PRACTITIONER

## 2023-10-24 PROCEDURE — 99214 OFFICE O/P EST MOD 30 MIN: CPT | Performed by: NURSE PRACTITIONER

## 2023-10-24 PROCEDURE — 1123F ACP DISCUSS/DSCN MKR DOCD: CPT | Performed by: NURSE PRACTITIONER

## 2023-10-24 PROCEDURE — G8484 FLU IMMUNIZE NO ADMIN: HCPCS | Performed by: NURSE PRACTITIONER

## 2023-10-24 PROCEDURE — 1036F TOBACCO NON-USER: CPT | Performed by: NURSE PRACTITIONER

## 2023-10-24 NOTE — PROGRESS NOTES
nerves grossly intact. LABORATORY STUDIES:   Latest Ref Rng 5/19/2023 7/11/2023 10/17/2023   [facility]       WBC 4.0 - 11.0 1000/mm3 4.5  5.5  4.6    HGB 13.0 - 18.0 gm/dl 12.8  13.6  16.0     - 450 1000/mm3 102 (L)  100 (L)  104 (L)    AST 0.0 - 33.9 U/L 29.0  30.0  22.0    ALT 10 - 49 U/L 23  19  16    Alk Phos 46 - 116 U/L 87  73  77    Bili, Total 0.30 - 1.20 mg/dl 0.80  1.00  0.60    Bili, Direct 0.0 - 0.3 mg/dl 0.3  0.3  0.2    Albumin 3.4 - 5.0 gm/dl 4.1  4.1  3.9    Albumin 3.4 - 5.0 gm/dl   3.8    BUN 9 - 23 mg/dl 18  16  18    Creat 0.70 - 1.30 mg/dl 1.33 (H)  1.44 (H)  1.30    Creat (iSTAT) 0.6 - 1.3 MG/DL      Na 136 - 145 mEq/L 140  139  142    K 3.5 - 5.1 mEq/L 3.9  4.2  4.0    Cl 98 - 107 mEq/L 104  105  107    CO2 20 - 31 mEq/L 27  28  26    Glucose 74 - 106 mg/dl 113 (H)  145 (H)  118 (H)    Magnesium 1.6 - 2.6 mg/dL 2.0  1.9  1.9      SEROLOGIES:  Not available or performed. Testing was performed today. LIVER HISTOLOGY:  Not available or performed    ENDOSCOPIC PROCEDURES:  Not available or performed    RADIOLOGY:  Not available or performed    OTHER TESTING:  Not available or performed    FOLLOW-UP:  All of the issues listed above in the Assessment and Plan were discussed with the patient. All questions were answered. The patient expressed a clear understanding of the above. Laboratory studies should be performed every 3 months to assess liver graft function and blood levels of immune suppression. Will need to check for HCV RNA. 40 minutes total time spent with this patient with more than 50% of this time spent counseling and coordinating care as described above. Rama in 6 months. Labs should be performed every 3 months.       BRANT Rosenthal-C  [facility] [place] [facility]  [address], [address] [facility]   [address], [address] [place] [address]   [phone]

## 2023-11-17 ENCOUNTER — OFFICE VISIT (OUTPATIENT)
Facility: CLINIC | Age: 73
End: 2023-11-17

## 2023-11-17 VITALS
WEIGHT: 217 LBS | OXYGEN SATURATION: 93 % | HEART RATE: 68 BPM | DIASTOLIC BLOOD PRESSURE: 70 MMHG | TEMPERATURE: 98 F | RESPIRATION RATE: 20 BRPM | HEIGHT: 72 IN | SYSTOLIC BLOOD PRESSURE: 132 MMHG | BODY MASS INDEX: 29.39 KG/M2

## 2023-11-17 DIAGNOSIS — N18.2 TYPE 2 DIABETES MELLITUS WITH STAGE 2 CHRONIC KIDNEY DISEASE, WITHOUT LONG-TERM CURRENT USE OF INSULIN (HCC): Primary | ICD-10-CM

## 2023-11-17 DIAGNOSIS — R25.2 LEG CRAMPS: ICD-10-CM

## 2023-11-17 DIAGNOSIS — I10 ESSENTIAL (PRIMARY) HYPERTENSION: ICD-10-CM

## 2023-11-17 DIAGNOSIS — E11.22 TYPE 2 DIABETES MELLITUS WITH STAGE 2 CHRONIC KIDNEY DISEASE, WITHOUT LONG-TERM CURRENT USE OF INSULIN (HCC): Primary | ICD-10-CM

## 2023-11-17 RX ORDER — PIOGLITAZONEHYDROCHLORIDE 15 MG/1
15 TABLET ORAL DAILY
Qty: 30 TABLET | Refills: 3 | Status: SHIPPED | OUTPATIENT
Start: 2023-11-17

## 2023-11-17 RX ORDER — METFORMIN HYDROCHLORIDE 500 MG/1
500 TABLET, EXTENDED RELEASE ORAL
Qty: 30 TABLET | Refills: 0 | Status: SHIPPED | OUTPATIENT
Start: 2023-11-17

## 2023-11-17 ASSESSMENT — VISUAL ACUITY
OD_CC: 20/30
OS_CC: 20/30

## 2023-11-17 ASSESSMENT — PATIENT HEALTH QUESTIONNAIRE - PHQ9
SUM OF ALL RESPONSES TO PHQ9 QUESTIONS 1 & 2: 0
SUM OF ALL RESPONSES TO PHQ QUESTIONS 1-9: 0
SUM OF ALL RESPONSES TO PHQ QUESTIONS 1-9: 0
1. LITTLE INTEREST OR PLEASURE IN DOING THINGS: 0
SUM OF ALL RESPONSES TO PHQ QUESTIONS 1-9: 0
SUM OF ALL RESPONSES TO PHQ QUESTIONS 1-9: 0
2. FEELING DOWN, DEPRESSED OR HOPELESS: 0

## 2023-11-17 NOTE — PROGRESS NOTES
Bri Ortiz is a 74 y.o. presents today for No chief complaint on file. Is someone accompanying this pt? NO    Is the patient using any DME equipment during OV? NO    Depression Screenin/17/2023    12:05 PM 2023     9:33 AM 2023     2:24 PM 2023     3:40 PM 2023    11:43 AM 2023     8:43 AM 2023    12:32 PM   PHQ-9 Questionaire   Little interest or pleasure in doing things 0 0 0 0 0 0 0   Feeling down, depressed, or hopeless 0 0 0 0 0 0 0   PHQ-9 Total Score 0 0 0 0 0 0 0       Abuse Screening:      3/21/2023     3:00 PM 2023     8:00 AM   AMB Abuse Screening   Do you ever feel afraid of your partner? N N   Are you in a relationship with someone who physically or mentally threatens you? N N   Is it safe for you to go home? Y Y       Learning Assessment:  No question data found. Fall Risk:      2023    12:04 PM 2023     9:34 AM 2023    11:43 AM 3/21/2023     3:06 PM 2023     8:43 AM   Fall Risk   2 or more falls in past year? no no no no no   Fall with injury in past year? no no no no no           Coordination of Care:   1. \"Have you been to the ER, urgent care clinic since your last visit? Hospitalized since your last visit? \" NO    2. \"Have you seen or consulted any other health care providers outside of the [address] [place] [address] since your last visit? \" LIVER SPECIALIST     3. For patients aged 43-73: Has the patient had a colonoscopy / FIT/ Cologuard? YES    If the patient is female:    4. For patients aged 43-66: Has the patient had a mammogram within the past 2 years? NO    5. For patients aged 21-65: Has the patient had a pap smear? NO    Health Maintenance: reviewed and discussed and ordered per Provider.     Health Maintenance Due   Topic Date Due    Diabetic retinal exam  Never done    Hepatitis A vaccine (1 of 2 - Risk 2-dose series) Never done    DTaP/Tdap/Td vaccine (1 - Tdap) Never done    Hepatitis B vaccine (1 of 3 -
note that glycosylated hemoglobin abnormal high, orders written for new lab studies as appropriate; see orders      I have discussed the diagnosis with the patient and the intended plan as seen in the above orders. The patient has received an after-visit summary and questions were answered concerning future plans. I have discussed medication side effects and warnings with the patient as well. I have reviewed the plan of care with the patient, accepted their input and they are in agreement with the treatment goals.

## 2023-11-19 RX ORDER — AMLODIPINE BESYLATE 5 MG/1
5 TABLET ORAL DAILY
Qty: 90 TABLET | Refills: 3 | OUTPATIENT
Start: 2023-11-19

## 2023-12-06 DIAGNOSIS — N18.2 TYPE 2 DIABETES MELLITUS WITH STAGE 2 CHRONIC KIDNEY DISEASE, WITHOUT LONG-TERM CURRENT USE OF INSULIN (HCC): ICD-10-CM

## 2023-12-06 DIAGNOSIS — E11.22 TYPE 2 DIABETES MELLITUS WITH STAGE 2 CHRONIC KIDNEY DISEASE, WITHOUT LONG-TERM CURRENT USE OF INSULIN (HCC): ICD-10-CM

## 2023-12-07 RX ORDER — METFORMIN HYDROCHLORIDE 500 MG/1
500 TABLET, EXTENDED RELEASE ORAL
Qty: 90 TABLET | Refills: 3 | Status: SHIPPED | OUTPATIENT
Start: 2023-12-07

## 2023-12-07 NOTE — TELEPHONE ENCOUNTER
Requested Prescriptions     Pending Prescriptions Disp Refills    metFORMIN (GLUCOPHAGE-XR) 500 MG extended release tablet [Pharmacy Med Name: METFORMIN HCL  MG TABLET] 90 tablet 1     Sig: TAKE 1 TABLET BY MOUTH EVERY DAY WITH BREAKFAST     Changed rx for 90 day supply.    Last appnt 11/17

## 2024-01-17 PROBLEM — I21.4 NSTEMI (NON-ST ELEVATED MYOCARDIAL INFARCTION) (HCC): Status: ACTIVE | Noted: 2024-01-17

## 2024-01-21 PROBLEM — Z79.60 LONG-TERM USE OF IMMUNOSUPPRESSANT MEDICATION: Status: ACTIVE | Noted: 2023-02-12

## 2024-01-21 PROBLEM — Z94.4 LIVER TRANSPLANT RECIPIENT (HCC): Status: ACTIVE | Noted: 2023-02-12

## 2024-03-15 ENCOUNTER — TELEPHONE (OUTPATIENT)
Age: 74
End: 2024-03-15

## 2024-03-15 DIAGNOSIS — Z79.60 LONG-TERM USE OF IMMUNOSUPPRESSANT MEDICATION: ICD-10-CM

## 2024-03-15 DIAGNOSIS — Z94.4 LIVER TRANSPLANT RECIPIENT (HCC): ICD-10-CM

## 2024-03-15 DIAGNOSIS — Z94.4 LIVER TRANSPLANT RECIPIENT (HCC): Primary | ICD-10-CM

## 2024-03-15 RX ORDER — MYCOPHENOLIC ACID 360 MG/1
360 TABLET, DELAYED RELEASE ORAL 2 TIMES DAILY
Qty: 180 TABLET | Refills: 3 | Status: SHIPPED | OUTPATIENT
Start: 2024-03-15 | End: 2025-03-15

## 2024-03-15 RX ORDER — TACROLIMUS 1 MG/1
CAPSULE ORAL
Qty: 270 CAPSULE | Refills: 3 | Status: SHIPPED | OUTPATIENT
Start: 2024-03-15 | End: 2025-03-15

## 2024-03-15 NOTE — TELEPHONE ENCOUNTER
Patient called requesting medication refills on tacrolimus and cellcept which were sent to the [facility] in [place]. Patient also requested lab orders be emailed to him. Emailed to [email or web address].

## 2024-04-08 DIAGNOSIS — Z79.60 LONG-TERM USE OF IMMUNOSUPPRESSANT MEDICATION: ICD-10-CM

## 2024-04-08 DIAGNOSIS — Z94.4 LIVER TRANSPLANT RECIPIENT (HCC): ICD-10-CM

## 2024-04-25 ENCOUNTER — HOSPITAL ENCOUNTER (OUTPATIENT)
Facility: HOSPITAL | Age: 74
Setting detail: SPECIMEN
Discharge: HOME | End: 2024-04-25
Payer: MEDICARE

## 2024-04-25 ENCOUNTER — OFFICE VISIT (OUTPATIENT)
Age: 74
End: 2024-04-25
Payer: MEDICARE

## 2024-04-25 VITALS
SYSTOLIC BLOOD PRESSURE: 137 MMHG | WEIGHT: 221 LBS | DIASTOLIC BLOOD PRESSURE: 67 MMHG | TEMPERATURE: 97.4 F | RESPIRATION RATE: 20 BRPM | OXYGEN SATURATION: 99 % | HEIGHT: 72 IN | HEART RATE: 64 BPM | BODY MASS INDEX: 29.93 KG/M2

## 2024-04-25 DIAGNOSIS — Z94.4 LIVER TRANSPLANT RECIPIENT (HCC): ICD-10-CM

## 2024-04-25 DIAGNOSIS — Z94.4 LIVER TRANSPLANT RECIPIENT (HCC): Primary | ICD-10-CM

## 2024-04-25 DIAGNOSIS — I20.9 ANGINA PECTORIS, UNSPECIFIED (HCC): ICD-10-CM

## 2024-04-25 PROBLEM — I25.119 ATHEROSCLEROTIC HEART DISEASE OF NATIVE CORONARY ARTERY WITH UNSPECIFIED ANGINA PECTORIS (HCC): Status: ACTIVE | Noted: 2024-04-25

## 2024-04-25 LAB
ALBUMIN SERPL-MCNC: 3.8 G/DL (ref 3.4–5)
ALBUMIN/GLOB SERPL: 1 (ref 0.8–1.7)
ALP SERPL-CCNC: 81 U/L (ref 45–117)
ALT SERPL-CCNC: 25 U/L (ref 16–61)
ANION GAP SERPL CALC-SCNC: 7 MMOL/L (ref 3–18)
AST SERPL-CCNC: 21 U/L (ref 10–38)
BILIRUB DIRECT SERPL-MCNC: 0.1 MG/DL (ref 0–0.2)
BILIRUB SERPL-MCNC: 0.5 MG/DL (ref 0.2–1)
BUN SERPL-MCNC: 20 MG/DL (ref 7–18)
BUN/CREAT SERPL: 14 (ref 12–20)
CALCIUM SERPL-MCNC: 9 MG/DL (ref 8.5–10.1)
CHLORIDE SERPL-SCNC: 105 MMOL/L (ref 100–111)
CO2 SERPL-SCNC: 25 MMOL/L (ref 21–32)
CREAT SERPL-MCNC: 1.48 MG/DL (ref 0.6–1.3)
GLOBULIN SER CALC-MCNC: 3.7 G/DL (ref 2–4)
GLUCOSE SERPL-MCNC: 262 MG/DL (ref 74–99)
POTASSIUM SERPL-SCNC: 4.2 MMOL/L (ref 3.5–5.5)
PROT SERPL-MCNC: 7.5 G/DL (ref 6.4–8.2)
SODIUM SERPL-SCNC: 137 MMOL/L (ref 136–145)

## 2024-04-25 PROCEDURE — 87522 HEPATITIS C REVRS TRNSCRPJ: CPT

## 2024-04-25 PROCEDURE — 80076 HEPATIC FUNCTION PANEL: CPT

## 2024-04-25 PROCEDURE — 1123F ACP DISCUSS/DSCN MKR DOCD: CPT | Performed by: NURSE PRACTITIONER

## 2024-04-25 PROCEDURE — 3075F SYST BP GE 130 - 139MM HG: CPT | Performed by: NURSE PRACTITIONER

## 2024-04-25 PROCEDURE — 80197 ASSAY OF TACROLIMUS: CPT

## 2024-04-25 PROCEDURE — 3017F COLORECTAL CA SCREEN DOC REV: CPT | Performed by: NURSE PRACTITIONER

## 2024-04-25 PROCEDURE — G8417 CALC BMI ABV UP PARAM F/U: HCPCS | Performed by: NURSE PRACTITIONER

## 2024-04-25 PROCEDURE — G8428 CUR MEDS NOT DOCUMENT: HCPCS | Performed by: NURSE PRACTITIONER

## 2024-04-25 PROCEDURE — 99214 OFFICE O/P EST MOD 30 MIN: CPT | Performed by: NURSE PRACTITIONER

## 2024-04-25 PROCEDURE — 36415 COLL VENOUS BLD VENIPUNCTURE: CPT

## 2024-04-25 PROCEDURE — 3078F DIAST BP <80 MM HG: CPT | Performed by: NURSE PRACTITIONER

## 2024-04-25 PROCEDURE — 1036F TOBACCO NON-USER: CPT | Performed by: NURSE PRACTITIONER

## 2024-04-25 PROCEDURE — 80048 BASIC METABOLIC PNL TOTAL CA: CPT

## 2024-04-25 NOTE — PROGRESS NOTES
[facility]      Michael Krishna MD, FACP, FACG, FAASLD      Cary Min, PA-C    Gerda Steinberg, [facility]   Quyen Zunigamanjuivory, [facility]   Elizabeth Deshawn, [facility]  Kareem Rainey, [facility]   Lula Jose, [facility]   Beatriz Palacioson, [facility]      Liver [facility]   [address], [address]   [place]  [address] [phone]   FAX: [phone]  [facility]   [address], [address]   [place]  [address] [phone]   FAX: [phone]         Patient Care Team:  Kendall Dasilva MD as PCP - General (Family Medicine)  Kendall Dasilva MD as PCP - [facility] Empaneled Provider      Patient Active Problem List   Diagnosis    Liver transplant recipient (HCC)    Liver transplant complication (HCC)    Long-term use of immunosuppressant medication    Alcohol use disorder in remission    Hepatitis C virus infection cured after antiviral drug therapy    Hypertension    Alcoholic cirrhosis of liver without ascites (HCC)    Type 2 diabetes mellitus with chronic kidney disease    Thrombocytopenia, unspecified (HCC)    NSTEMI (non-ST elevated myocardial infarction) (HCC)       Constantine Manzano returns to the [facility] today for education and management of liver transplant graft function, to monitor and adjust immune suppression and to assess for recurrence of the primary liver disease.      The active problem list, all pertinent past medical history, medications, liver histology, endoscopic studies, radiologic findings and laboratory findings related to the liver disorder were reviewed with the patient.     The patient underwent a liver transplant at [facility] in [facility] in 9/2011.    He moved to [place], [place] in 2017 and was followed by Dr Moore and [facility]    He moved to [place], [place] in

## 2024-04-26 LAB
HCV RNA SERPL NAA+PROBE-LOG IU: NOT DETECTED HCV LOG 10IU/ML
HCV RNA SERPL PROBE AMP-ACNC: NOT DETECTED HCVIU/ML

## 2024-04-27 LAB — TACROLIMUS BLD-MCNC: 1.2 NG/ML (ref 2–20)

## 2024-04-29 ENCOUNTER — TELEPHONE (OUTPATIENT)
Age: 74
End: 2024-04-29

## 2024-04-29 RX ORDER — TACROLIMUS 1 MG/1
2 CAPSULE ORAL 2 TIMES DAILY
Qty: 120 CAPSULE | Refills: 11 | Status: SHIPPED | OUTPATIENT
Start: 2024-04-29

## 2024-07-08 DIAGNOSIS — E11.22 TYPE 2 DIABETES MELLITUS WITH STAGE 2 CHRONIC KIDNEY DISEASE, WITHOUT LONG-TERM CURRENT USE OF INSULIN (HCC): ICD-10-CM

## 2024-07-08 DIAGNOSIS — Z79.60 LONG-TERM USE OF IMMUNOSUPPRESSANT MEDICATION: ICD-10-CM

## 2024-07-08 DIAGNOSIS — N18.2 TYPE 2 DIABETES MELLITUS WITH STAGE 2 CHRONIC KIDNEY DISEASE, WITHOUT LONG-TERM CURRENT USE OF INSULIN (HCC): ICD-10-CM

## 2024-07-08 DIAGNOSIS — Z94.4 LIVER TRANSPLANT RECIPIENT (HCC): Primary | ICD-10-CM

## 2024-07-08 RX ORDER — METFORMIN HCL 500 MG
500 TABLET, EXTENDED RELEASE 24 HR ORAL
Qty: 90 TABLET | Refills: 3 | OUTPATIENT
Start: 2024-07-08

## 2024-07-22 ENCOUNTER — TELEPHONE (OUTPATIENT)
Age: 74
End: 2024-07-22

## 2024-09-06 DIAGNOSIS — I10 ESSENTIAL (PRIMARY) HYPERTENSION: ICD-10-CM

## 2024-09-06 RX ORDER — LOSARTAN POTASSIUM 50 MG/1
TABLET ORAL
Qty: 180 TABLET | Refills: 3 | OUTPATIENT
Start: 2024-09-06

## 2024-09-10 DIAGNOSIS — Z79.60 LONG-TERM USE OF IMMUNOSUPPRESSANT MEDICATION: ICD-10-CM

## 2024-09-10 DIAGNOSIS — Z94.4 LIVER TRANSPLANT RECIPIENT (HCC): ICD-10-CM

## 2024-09-16 DIAGNOSIS — Z94.4 LIVER TRANSPLANT RECIPIENT (HCC): ICD-10-CM

## 2024-09-16 DIAGNOSIS — Z79.60 LONG-TERM USE OF IMMUNOSUPPRESSANT MEDICATION: ICD-10-CM

## 2024-09-16 RX ORDER — TACROLIMUS 1 MG/1
2 CAPSULE ORAL 2 TIMES DAILY
Qty: 120 CAPSULE | Refills: 11 | Status: SHIPPED | OUTPATIENT
Start: 2024-09-16

## 2024-09-17 RX ORDER — MYCOPHENOLIC ACID 360 MG/1
1 TABLET, DELAYED RELEASE ORAL 2 TIMES DAILY
Qty: 180 TABLET | Refills: 4 | Status: SHIPPED | OUTPATIENT
Start: 2024-09-17

## 2024-09-19 DIAGNOSIS — E11.22 TYPE 2 DIABETES MELLITUS WITH STAGE 2 CHRONIC KIDNEY DISEASE, WITHOUT LONG-TERM CURRENT USE OF INSULIN (HCC): ICD-10-CM

## 2024-09-19 DIAGNOSIS — N18.2 TYPE 2 DIABETES MELLITUS WITH STAGE 2 CHRONIC KIDNEY DISEASE, WITHOUT LONG-TERM CURRENT USE OF INSULIN (HCC): ICD-10-CM

## 2024-10-08 RX ORDER — METFORMIN HCL 500 MG
500 TABLET, EXTENDED RELEASE 24 HR ORAL
Qty: 90 TABLET | Refills: 3 | OUTPATIENT
Start: 2024-10-08

## 2024-10-20 DIAGNOSIS — I10 ESSENTIAL (PRIMARY) HYPERTENSION: ICD-10-CM

## 2024-10-21 NOTE — TELEPHONE ENCOUNTER
Medication(s) requesting:   Requested Prescriptions     Pending Prescriptions Disp Refills    amLODIPine (NORVASC) 5 MG tablet [Pharmacy Med Name: AMLODIPINE BESYLATE 5 MG TAB] 90 tablet 3     Sig: TAKE 1 TABLET BY MOUTH EVERY DAY

## 2024-10-24 RX ORDER — AMLODIPINE BESYLATE 5 MG/1
5 TABLET ORAL DAILY
Qty: 90 TABLET | Refills: 3 | OUTPATIENT
Start: 2024-10-24

## 2024-10-27 DIAGNOSIS — I10 ESSENTIAL (PRIMARY) HYPERTENSION: ICD-10-CM

## 2024-10-31 ENCOUNTER — TELEPHONE (OUTPATIENT)
Age: 74
End: 2024-10-31

## 2024-10-31 DIAGNOSIS — I10 ESSENTIAL (PRIMARY) HYPERTENSION: ICD-10-CM

## 2024-10-31 DIAGNOSIS — Z94.4 LIVER TRANSPLANT RECIPIENT (HCC): Primary | ICD-10-CM

## 2024-10-31 NOTE — TELEPHONE ENCOUNTER
Received lab results for patient from yesterday drawn at [facility]. Noticed the lab results excluded the hepatic function panel. Call the [facility] OP Lab requesting they add it to the sample drawn yesterday. Was given the fax # [phone], faxed over order.

## 2024-11-04 RX ORDER — LOSARTAN POTASSIUM 50 MG/1
TABLET ORAL
Qty: 180 TABLET | Refills: 3 | OUTPATIENT
Start: 2024-11-04

## 2024-11-05 ENCOUNTER — OFFICE VISIT (OUTPATIENT)
Age: 74
End: 2024-11-05
Payer: MEDICARE

## 2024-11-05 VITALS
TEMPERATURE: 97.7 F | OXYGEN SATURATION: 98 % | HEIGHT: 72 IN | HEART RATE: 54 BPM | DIASTOLIC BLOOD PRESSURE: 65 MMHG | SYSTOLIC BLOOD PRESSURE: 141 MMHG | WEIGHT: 219 LBS | BODY MASS INDEX: 29.66 KG/M2

## 2024-11-05 DIAGNOSIS — Z94.4 LIVER TRANSPLANT RECIPIENT (HCC): ICD-10-CM

## 2024-11-05 DIAGNOSIS — Z79.60 LONG-TERM USE OF IMMUNOSUPPRESSANT MEDICATION: ICD-10-CM

## 2024-11-05 PROCEDURE — 3078F DIAST BP <80 MM HG: CPT | Performed by: NURSE PRACTITIONER

## 2024-11-05 PROCEDURE — G8484 FLU IMMUNIZE NO ADMIN: HCPCS | Performed by: NURSE PRACTITIONER

## 2024-11-05 PROCEDURE — 91200 LIVER ELASTOGRAPHY: CPT | Performed by: NURSE PRACTITIONER

## 2024-11-05 PROCEDURE — 99214 OFFICE O/P EST MOD 30 MIN: CPT | Performed by: NURSE PRACTITIONER

## 2024-11-05 PROCEDURE — 3017F COLORECTAL CA SCREEN DOC REV: CPT | Performed by: NURSE PRACTITIONER

## 2024-11-05 PROCEDURE — 1123F ACP DISCUSS/DSCN MKR DOCD: CPT | Performed by: NURSE PRACTITIONER

## 2024-11-05 PROCEDURE — 1159F MED LIST DOCD IN RCRD: CPT | Performed by: NURSE PRACTITIONER

## 2024-11-05 PROCEDURE — 1126F AMNT PAIN NOTED NONE PRSNT: CPT | Performed by: NURSE PRACTITIONER

## 2024-11-05 PROCEDURE — G8417 CALC BMI ABV UP PARAM F/U: HCPCS | Performed by: NURSE PRACTITIONER

## 2024-11-05 PROCEDURE — 3077F SYST BP >= 140 MM HG: CPT | Performed by: NURSE PRACTITIONER

## 2024-11-05 PROCEDURE — 1036F TOBACCO NON-USER: CPT | Performed by: NURSE PRACTITIONER

## 2024-11-05 PROCEDURE — 1160F RVW MEDS BY RX/DR IN RCRD: CPT | Performed by: NURSE PRACTITIONER

## 2024-11-05 PROCEDURE — G8427 DOCREV CUR MEDS BY ELIG CLIN: HCPCS | Performed by: NURSE PRACTITIONER

## 2024-11-05 RX ORDER — METOPROLOL SUCCINATE 50 MG/1
50 TABLET, EXTENDED RELEASE ORAL DAILY
COMMUNITY

## 2024-11-05 RX ORDER — AMLODIPINE BESYLATE 5 MG/1
5 TABLET ORAL DAILY
COMMUNITY

## 2024-11-05 RX ORDER — KETOROLAC TROMETHAMINE 5 MG/ML
SOLUTION OPHTHALMIC
COMMUNITY
End: 2024-11-05

## 2024-11-05 RX ORDER — ROSUVASTATIN CALCIUM 20 MG/1
TABLET, COATED ORAL
COMMUNITY
Start: 2024-03-14 | End: 2024-11-05

## 2024-11-05 RX ORDER — SILDENAFIL 50 MG/1
1 TABLET, FILM COATED ORAL PRN
COMMUNITY
End: 2024-11-05

## 2024-11-26 DIAGNOSIS — Z79.60 LONG-TERM USE OF IMMUNOSUPPRESSANT MEDICATION: ICD-10-CM

## 2024-11-26 DIAGNOSIS — Z94.4 LIVER TRANSPLANT RECIPIENT (HCC): ICD-10-CM

## 2024-12-04 RX ORDER — MYCOPHENOLIC ACID 360 MG/1
1 TABLET, DELAYED RELEASE ORAL 2 TIMES DAILY
Qty: 180 TABLET | Refills: 5 | Status: SHIPPED | OUTPATIENT
Start: 2024-12-04

## 2025-01-06 DIAGNOSIS — I10 ESSENTIAL (PRIMARY) HYPERTENSION: ICD-10-CM

## 2025-01-13 RX ORDER — LOSARTAN POTASSIUM 50 MG/1
TABLET ORAL
Qty: 180 TABLET | Refills: 3 | OUTPATIENT
Start: 2025-01-13

## 2025-02-04 PROBLEM — R06.02 SOB (SHORTNESS OF BREATH): Status: ACTIVE | Noted: 2025-02-04

## 2025-02-05 PROBLEM — I25.10 MULTIPLE VESSEL CORONARY ARTERY DISEASE: Status: ACTIVE | Noted: 2025-02-05

## 2025-02-13 ENCOUNTER — TELEPHONE (OUTPATIENT)
Age: 75
End: 2025-02-13

## 2025-02-13 NOTE — TELEPHONE ENCOUNTER
Called patient to remind him his labs for Yair Rainey NP, are due in the month of February. Reminded him about the 12 hour trough with the tacrolimus lab. Patient verbally confirmed understanding.

## 2025-02-19 ENCOUNTER — TELEPHONE (OUTPATIENT)
Age: 75
End: 2025-02-19

## 2025-02-19 DIAGNOSIS — Z94.4 LIVER TRANSPLANT RECIPIENT (HCC): Primary | ICD-10-CM

## 2025-02-19 DIAGNOSIS — Z94.4 LIVER TRANSPLANT RECIPIENT (HCC): ICD-10-CM

## 2025-02-19 DIAGNOSIS — Z79.60 LONG-TERM USE OF IMMUNOSUPPRESSANT MEDICATION: Primary | ICD-10-CM

## 2025-02-19 NOTE — TELEPHONE ENCOUNTER
Called patient to review recent lab results from 2/17/25. Informed patient his lab results are stable and within normal limits. Patient verbally confirmed understanding.

## 2025-02-19 NOTE — TELEPHONE ENCOUNTER
Called [facility] OP Lab, to request a hepatic function panel be added to patients labs from 2/17/25. Gave verbal order and placed an order in EP[facility].  will call me if she needs anything else.

## 2025-04-15 DIAGNOSIS — Z94.4 LIVER TRANSPLANT RECIPIENT (HCC): ICD-10-CM

## 2025-04-15 DIAGNOSIS — Z79.60 LONG-TERM USE OF IMMUNOSUPPRESSANT MEDICATION: ICD-10-CM

## 2025-04-15 RX ORDER — TACROLIMUS 1 MG/1
CAPSULE ORAL
Qty: 90 CAPSULE | Refills: 11 | Status: SHIPPED | OUTPATIENT
Start: 2025-04-15

## 2025-04-21 ENCOUNTER — TELEPHONE (OUTPATIENT)
Age: 75
End: 2025-04-21

## 2025-04-21 NOTE — TELEPHONE ENCOUNTER
spoke with the pt and explain to her/him that our office is closing and suggest she/he get in touch with her/his PCP or  insurance carrier to obtain a new referral to another hepatologist

## 2025-06-10 DIAGNOSIS — Z79.60 LONG-TERM USE OF IMMUNOSUPPRESSANT MEDICATION: ICD-10-CM

## 2025-06-10 DIAGNOSIS — Z94.4 LIVER TRANSPLANT RECIPIENT (HCC): Primary | ICD-10-CM

## 2025-07-14 ENCOUNTER — TELEPHONE (OUTPATIENT)
Age: 75
End: 2025-07-14

## 2025-07-14 NOTE — TELEPHONE ENCOUNTER
Called patient to review recent lab results from 7/11/25. Informed patient his lab results are stable. Patient verbally confirmed understanding.

## 2025-09-05 ENCOUNTER — TELEPHONE (OUTPATIENT)
Age: 75
End: 2025-09-05